# Patient Record
Sex: MALE | Race: WHITE | NOT HISPANIC OR LATINO | Employment: UNEMPLOYED
[De-identification: names, ages, dates, MRNs, and addresses within clinical notes are randomized per-mention and may not be internally consistent; named-entity substitution may affect disease eponyms.]

---

## 2017-01-04 ENCOUNTER — INFUSION - HEALTHEAST (OUTPATIENT)
Dept: INFUSION THERAPY | Age: 57
End: 2017-01-04

## 2017-01-04 DIAGNOSIS — E88.01 ALPHA-1-ANTITRYPSIN DEFICIENCY (H): ICD-10-CM

## 2017-01-10 ENCOUNTER — INFUSION - HEALTHEAST (OUTPATIENT)
Dept: INFUSION THERAPY | Age: 57
End: 2017-01-10

## 2017-01-10 ENCOUNTER — RECORDS - HEALTHEAST (OUTPATIENT)
Dept: ADMINISTRATIVE | Facility: OTHER | Age: 57
End: 2017-01-10

## 2017-01-10 DIAGNOSIS — E88.01 ALPHA-1-ANTITRYPSIN DEFICIENCY (H): ICD-10-CM

## 2017-01-17 ENCOUNTER — INFUSION - HEALTHEAST (OUTPATIENT)
Dept: INFUSION THERAPY | Age: 57
End: 2017-01-17

## 2017-01-17 DIAGNOSIS — E88.01 ALPHA-1-ANTITRYPSIN DEFICIENCY (H): ICD-10-CM

## 2017-01-25 ENCOUNTER — INFUSION - HEALTHEAST (OUTPATIENT)
Dept: INFUSION THERAPY | Age: 57
End: 2017-01-25

## 2017-01-25 DIAGNOSIS — E88.01 ALPHA-1-ANTITRYPSIN DEFICIENCY (H): ICD-10-CM

## 2017-02-01 ENCOUNTER — INFUSION - HEALTHEAST (OUTPATIENT)
Dept: INFUSION THERAPY | Age: 57
End: 2017-02-01

## 2017-02-01 DIAGNOSIS — E88.01 ALPHA-1-ANTITRYPSIN DEFICIENCY (H): ICD-10-CM

## 2017-02-07 ENCOUNTER — INFUSION - HEALTHEAST (OUTPATIENT)
Dept: INFUSION THERAPY | Age: 57
End: 2017-02-07

## 2017-02-07 DIAGNOSIS — E88.01 ALPHA-1-ANTITRYPSIN DEFICIENCY (H): ICD-10-CM

## 2017-02-14 ENCOUNTER — INFUSION - HEALTHEAST (OUTPATIENT)
Dept: INFUSION THERAPY | Age: 57
End: 2017-02-14

## 2017-02-14 DIAGNOSIS — E88.01 ALPHA-1-ANTITRYPSIN DEFICIENCY (H): ICD-10-CM

## 2017-02-21 ENCOUNTER — INFUSION - HEALTHEAST (OUTPATIENT)
Dept: INFUSION THERAPY | Age: 57
End: 2017-02-21

## 2017-02-21 DIAGNOSIS — E88.01 ALPHA-1-ANTITRYPSIN DEFICIENCY (H): ICD-10-CM

## 2017-02-28 ENCOUNTER — INFUSION - HEALTHEAST (OUTPATIENT)
Dept: INFUSION THERAPY | Age: 57
End: 2017-02-28

## 2017-02-28 DIAGNOSIS — E88.01 ALPHA-1-ANTITRYPSIN DEFICIENCY (H): ICD-10-CM

## 2017-03-08 ENCOUNTER — INFUSION - HEALTHEAST (OUTPATIENT)
Dept: INFUSION THERAPY | Age: 57
End: 2017-03-08

## 2017-03-08 DIAGNOSIS — E88.01 ALPHA-1-ANTITRYPSIN DEFICIENCY (H): ICD-10-CM

## 2017-03-14 ENCOUNTER — INFUSION - HEALTHEAST (OUTPATIENT)
Dept: INFUSION THERAPY | Age: 57
End: 2017-03-14

## 2017-03-14 DIAGNOSIS — E88.01 ALPHA-1-ANTITRYPSIN DEFICIENCY (H): ICD-10-CM

## 2017-03-22 ENCOUNTER — INFUSION - HEALTHEAST (OUTPATIENT)
Dept: INFUSION THERAPY | Age: 57
End: 2017-03-22

## 2017-03-22 DIAGNOSIS — E88.01 ALPHA-1-ANTITRYPSIN DEFICIENCY (H): ICD-10-CM

## 2017-03-30 ENCOUNTER — RECORDS - HEALTHEAST (OUTPATIENT)
Dept: ADMINISTRATIVE | Facility: OTHER | Age: 57
End: 2017-03-30

## 2017-03-30 ENCOUNTER — INFUSION - HEALTHEAST (OUTPATIENT)
Dept: INFUSION THERAPY | Age: 57
End: 2017-03-30

## 2017-03-30 DIAGNOSIS — E88.01 ALPHA-1-ANTITRYPSIN DEFICIENCY (H): ICD-10-CM

## 2017-04-05 ENCOUNTER — INFUSION - HEALTHEAST (OUTPATIENT)
Dept: INFUSION THERAPY | Age: 57
End: 2017-04-05

## 2017-04-05 DIAGNOSIS — E88.01 ALPHA-1-ANTITRYPSIN DEFICIENCY (H): ICD-10-CM

## 2017-04-11 ENCOUNTER — INFUSION - HEALTHEAST (OUTPATIENT)
Dept: INFUSION THERAPY | Age: 57
End: 2017-04-11

## 2017-04-11 DIAGNOSIS — E88.01 ALPHA-1-ANTITRYPSIN DEFICIENCY (H): ICD-10-CM

## 2017-04-18 ENCOUNTER — INFUSION - HEALTHEAST (OUTPATIENT)
Dept: INFUSION THERAPY | Age: 57
End: 2017-04-18

## 2017-04-18 DIAGNOSIS — E88.01 ALPHA-1-ANTITRYPSIN DEFICIENCY (H): ICD-10-CM

## 2017-04-26 ENCOUNTER — INFUSION - HEALTHEAST (OUTPATIENT)
Dept: INFUSION THERAPY | Age: 57
End: 2017-04-26

## 2017-04-26 ENCOUNTER — RECORDS - HEALTHEAST (OUTPATIENT)
Dept: ADMINISTRATIVE | Facility: OTHER | Age: 57
End: 2017-04-26

## 2017-04-26 DIAGNOSIS — E88.01 ALPHA-1-ANTITRYPSIN DEFICIENCY (H): ICD-10-CM

## 2017-05-04 ENCOUNTER — INFUSION - HEALTHEAST (OUTPATIENT)
Dept: INFUSION THERAPY | Age: 57
End: 2017-05-04

## 2017-05-04 DIAGNOSIS — E88.01 ALPHA-1-ANTITRYPSIN DEFICIENCY (H): ICD-10-CM

## 2017-05-10 ENCOUNTER — INFUSION - HEALTHEAST (OUTPATIENT)
Dept: INFUSION THERAPY | Age: 57
End: 2017-05-10

## 2017-05-10 DIAGNOSIS — E88.01 ALPHA-1-ANTITRYPSIN DEFICIENCY (H): ICD-10-CM

## 2017-05-16 ENCOUNTER — INFUSION - HEALTHEAST (OUTPATIENT)
Dept: INFUSION THERAPY | Age: 57
End: 2017-05-16

## 2017-05-16 DIAGNOSIS — E88.01 ALPHA-1-ANTITRYPSIN DEFICIENCY (H): ICD-10-CM

## 2017-05-23 ENCOUNTER — INFUSION - HEALTHEAST (OUTPATIENT)
Dept: INFUSION THERAPY | Age: 57
End: 2017-05-23

## 2017-05-23 DIAGNOSIS — E88.01 ALPHA-1-ANTITRYPSIN DEFICIENCY (H): ICD-10-CM

## 2017-05-31 ENCOUNTER — INFUSION - HEALTHEAST (OUTPATIENT)
Dept: INFUSION THERAPY | Age: 57
End: 2017-05-31

## 2017-05-31 DIAGNOSIS — E88.01 ALPHA-1-ANTITRYPSIN DEFICIENCY (H): ICD-10-CM

## 2017-06-05 ENCOUNTER — INFUSION - HEALTHEAST (OUTPATIENT)
Dept: INFUSION THERAPY | Age: 57
End: 2017-06-05

## 2017-06-05 DIAGNOSIS — E88.01 ALPHA-1-ANTITRYPSIN DEFICIENCY (H): ICD-10-CM

## 2018-01-12 ENCOUNTER — TELEPHONE (OUTPATIENT)
Dept: FAMILY MEDICINE | Facility: CLINIC | Age: 58
End: 2018-01-12

## 2018-01-12 NOTE — TELEPHONE ENCOUNTER
Reason for Call:  Other    Detailed comments: Alphaoneangigrypsin Lung difficiency, has pneumonia like symptoms last month and 1/2.    Phone Number Patient can be reached at: [phone]    Best Time: any    Call taken on 1/12/2018 at 1:15 PM by Arpita Baer

## 2018-01-12 NOTE — TELEPHONE ENCOUNTER
"\"My boyfriend is having trouble breathing and he had a diarrhea bug a few weeks ago and he has Alpha 1 antitripsin deficiency. He was just with this home care Nurse and they said to call to see if he can get in with someone or go to the ER. \"    \"His Temp is 101..\"    I asked if patient was struggling to breathe or had bluish lips or tongue and girlfriend stated: \"yes\"     I then stated they needed to hang up and call 911. To which girlfriend stated: \"He doesn't have bluish lips or tongue..\"     \"We are having trouble hearing you on speaker phone- we will just take him to the ER.. And she then hung up.    Jerri Cabral RN        "

## 2018-04-14 ENCOUNTER — TRANSFERRED RECORDS (OUTPATIENT)
Dept: HEALTH INFORMATION MANAGEMENT | Facility: CLINIC | Age: 58
End: 2018-04-14

## 2018-08-16 ENCOUNTER — HOSPITAL ENCOUNTER (EMERGENCY)
Facility: CLINIC | Age: 58
Discharge: HOME | End: 2018-08-16
Attending: EMERGENCY MEDICINE | Admitting: EMERGENCY MEDICINE
Payer: COMMERCIAL

## 2018-08-16 VITALS
SYSTOLIC BLOOD PRESSURE: 110 MMHG | BODY MASS INDEX: 24.5 KG/M2 | TEMPERATURE: 99 F | OXYGEN SATURATION: 91 % | HEIGHT: 71 IN | RESPIRATION RATE: 20 BRPM | WEIGHT: 175 LBS | DIASTOLIC BLOOD PRESSURE: 65 MMHG

## 2018-08-16 DIAGNOSIS — J44.1 COPD EXACERBATION (H): ICD-10-CM

## 2018-08-16 LAB
ANION GAP SERPL CALCULATED.3IONS-SCNC: 4 MMOL/L (ref 3–14)
BASE EXCESS BLDV CALC-SCNC: 3.5 MMOL/L
BASOPHILS # BLD AUTO: 0 10E9/L (ref 0–0.2)
BASOPHILS NFR BLD AUTO: 0.5 %
BUN SERPL-MCNC: 21 MG/DL (ref 7–30)
CALCIUM SERPL-MCNC: 8.1 MG/DL (ref 8.5–10.1)
CHLORIDE SERPL-SCNC: 112 MMOL/L (ref 94–109)
CO2 SERPL-SCNC: 25 MMOL/L (ref 20–32)
CREAT SERPL-MCNC: 1.08 MG/DL (ref 0.66–1.25)
CRP SERPL-MCNC: <2.9 MG/L (ref 0–8)
DIFFERENTIAL METHOD BLD: ABNORMAL
EOSINOPHIL # BLD AUTO: 0.4 10E9/L (ref 0–0.7)
EOSINOPHIL NFR BLD AUTO: 5.8 %
ERYTHROCYTE [DISTWIDTH] IN BLOOD BY AUTOMATED COUNT: 13.2 % (ref 10–15)
GFR SERPL CREATININE-BSD FRML MDRD: 70 ML/MIN/1.7M2
GLUCOSE SERPL-MCNC: 102 MG/DL (ref 70–99)
HCO3 BLDV-SCNC: 28 MMOL/L (ref 21–28)
HCT VFR BLD AUTO: 43.7 % (ref 40–53)
HGB BLD-MCNC: 14 G/DL (ref 13.3–17.7)
IMM GRANULOCYTES # BLD: 0 10E9/L (ref 0–0.4)
IMM GRANULOCYTES NFR BLD: 0.3 %
LYMPHOCYTES # BLD AUTO: 1.1 10E9/L (ref 0.8–5.3)
LYMPHOCYTES NFR BLD AUTO: 17.2 %
MCH RBC QN AUTO: 32.8 PG (ref 26.5–33)
MCHC RBC AUTO-ENTMCNC: 32 G/DL (ref 31.5–36.5)
MCV RBC AUTO: 102 FL (ref 78–100)
MONOCYTES # BLD AUTO: 0.6 10E9/L (ref 0–1.3)
MONOCYTES NFR BLD AUTO: 9.1 %
NEUTROPHILS # BLD AUTO: 4.4 10E9/L (ref 1.6–8.3)
NEUTROPHILS NFR BLD AUTO: 67.1 %
NRBC # BLD AUTO: 0 10*3/UL
NRBC BLD AUTO-RTO: 0 /100
PCO2 BLDV: 43 MM HG (ref 40–50)
PH BLDV: 7.43 PH (ref 7.32–7.43)
PLATELET # BLD AUTO: 185 10E9/L (ref 150–450)
PO2 BLDV: 51 MM HG (ref 25–47)
POTASSIUM SERPL-SCNC: 4.3 MMOL/L (ref 3.4–5.3)
RBC # BLD AUTO: 4.27 10E12/L (ref 4.4–5.9)
SODIUM SERPL-SCNC: 141 MMOL/L (ref 133–144)
TROPONIN I SERPL-MCNC: <0.015 UG/L (ref 0–0.04)
WBC # BLD AUTO: 6.6 10E9/L (ref 4–11)

## 2018-08-16 PROCEDURE — 93005 ELECTROCARDIOGRAM TRACING: CPT | Performed by: EMERGENCY MEDICINE

## 2018-08-16 PROCEDURE — 85025 COMPLETE CBC W/AUTO DIFF WBC: CPT | Performed by: EMERGENCY MEDICINE

## 2018-08-16 PROCEDURE — 80048 BASIC METABOLIC PNL TOTAL CA: CPT | Performed by: EMERGENCY MEDICINE

## 2018-08-16 PROCEDURE — 94640 AIRWAY INHALATION TREATMENT: CPT

## 2018-08-16 PROCEDURE — 84484 ASSAY OF TROPONIN QUANT: CPT | Performed by: EMERGENCY MEDICINE

## 2018-08-16 PROCEDURE — 99284 EMERGENCY DEPT VISIT MOD MDM: CPT | Mod: 25 | Performed by: EMERGENCY MEDICINE

## 2018-08-16 PROCEDURE — 93010 ELECTROCARDIOGRAM REPORT: CPT | Mod: Z6 | Performed by: EMERGENCY MEDICINE

## 2018-08-16 PROCEDURE — 86140 C-REACTIVE PROTEIN: CPT | Performed by: EMERGENCY MEDICINE

## 2018-08-16 PROCEDURE — 25000125 ZZHC RX 250: Performed by: EMERGENCY MEDICINE

## 2018-08-16 PROCEDURE — 82803 BLOOD GASES ANY COMBINATION: CPT | Performed by: EMERGENCY MEDICINE

## 2018-08-16 RX ORDER — IPRATROPIUM BROMIDE AND ALBUTEROL SULFATE 2.5; .5 MG/3ML; MG/3ML
3 SOLUTION RESPIRATORY (INHALATION) ONCE
Status: COMPLETED | OUTPATIENT
Start: 2018-08-16 | End: 2018-08-16

## 2018-08-16 RX ORDER — TAMSULOSIN HYDROCHLORIDE 0.4 MG/1
0.4 CAPSULE ORAL DAILY
COMMUNITY
Start: 2016-11-28 | End: 2020-06-01

## 2018-08-16 RX ORDER — ALBUTEROL SULFATE 0.83 MG/ML
2.5 SOLUTION RESPIRATORY (INHALATION)
COMMUNITY
Start: 2016-11-28

## 2018-08-16 RX ORDER — BUDESONIDE AND FORMOTEROL FUMARATE DIHYDRATE 160; 4.5 UG/1; UG/1
2 AEROSOL RESPIRATORY (INHALATION)
COMMUNITY
Start: 2016-11-28 | End: 2020-06-01

## 2018-08-16 RX ADMIN — IPRATROPIUM BROMIDE AND ALBUTEROL SULFATE 3 ML: .5; 2.5 SOLUTION RESPIRATORY (INHALATION) at 15:53

## 2018-08-16 ASSESSMENT — ENCOUNTER SYMPTOMS
FEVER: 0
SHORTNESS OF BREATH: 1
CHILLS: 0
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
## 2018-08-16 NOTE — ED AVS SNAPSHOT
[facility] Emergency Department    [address] [facility] [address]    Phone:  [phone]    Fax:  [phone]                                       Sg Quintana   MRN: [id number]    Department:  [facility] Emergency Department   Date of Visit:  8/16/2018           After Visit Summary Signature Page     I have received my discharge instructions, and my questions have been answered. I have discussed any challenges I see with this plan with the nurse or doctor.    ..........................................................................................................................................  Patient/Patient Representative Signature      ..........................................................................................................................................  Patient Representative Print Name and Relationship to Patient    ..................................................               ................................................  Date                                            Time    ..........................................................................................................................................  Reviewed by Signature/Title    ...................................................              ..............................................  Date                                                            Time

## 2018-08-16 NOTE — ED AVS SNAPSHOT
[facility] Emergency Department    [address] [facility] [address]    Phone:  [phone]    Fax:  [phone]                                       Sg Quintana   MRN: [id number]    Department:  [facility] Emergency Department   Date of Visit:  8/16/2018           Patient Information     Date Of Birth          1960        Your diagnoses for this visit were:     COPD exacerbation (H)        You were seen by Tushar Franks MD.      Follow-up Information     Follow up with No Ref-Primary, Physician.    Why:  As needed      Discharge References/Attachments     COPD FLARE (ENGLISH)      24 Hour Appointment Hotline       To make an appointment at any [facility] [facility] clinic, call [phone] ([phone]). If you don't have a family doctor or clinic, we will help you find one. [facility] [facility] clinics are conveniently located to serve the needs of you and your family.             Review of your medicines      Our records show that you are taking the medicines listed below. If these are incorrect, please call your family doctor or clinic.        Dose / Directions Last dose taken    albuterol (2.5 MG/3ML) 0.083% neb solution   Dose:  2.5 mg        Inhale 2.5 mg into the lungs   Refills:  0        fluticasone-salmeterol 100-50 MCG/DOSE diskus inhaler   Commonly known as:  ADVAIR   Dose:  1 puff        Inhale 1 puff into the lungs every 12 hours   Refills:  0        proteinase inhibitor 500 MG Solr   Commonly known as:  PROLASTIN   Dose:  60 mg/kg        Inject 60 mg/kg into the vein once a week   Refills:  0        sertraline 50 MG tablet   Commonly known as:  ZOLOFT        Take by mouth daily   Refills:  0        SYMBICORT 160-4.5 MCG/ACT Inhaler   Dose:  2 puff   Generic drug:  budesonide-formoterol        Inhale 2 puffs into the lungs   Refills:  0        * tamsulosin 0.4 MG capsule   Commonly known as:  FLOMAX   Dose:  0.4 mg   Quantity:  30 capsule        Take 1 capsule (0.4 mg) by mouth daily    Refills:  11        * tamsulosin 0.4 MG capsule   Commonly known as:  FLOMAX   Dose:  0.4 mg        Take 0.4 mg by mouth daily   Refills:  0        tiotropium 18 MCG capsule   Commonly known as:  SPIRIVA   Dose:  18 mcg        Inhale 18 mcg into the lungs daily   Refills:  0        * Notice:  This list has 2 medication(s) that are the same as other medications prescribed for you. Read the directions carefully, and ask your doctor or other care provider to review them with you.            Procedures and tests performed during your visit     Basic metabolic panel    Blood gas venous    CBC with platelets differential    CRP inflammation    EKG 12-lead, tracing only    Troponin I      Orders Needing Specimen Collection     None      Pending Results     No orders found from 8/14/2018 to 8/17/2018.            Pending Culture Results     No orders found from 8/14/2018 to 8/17/2018.            Pending Results Instructions     If you had any lab results that were not finalized at the time of your Discharge, you can call the ED Lab Result RN at [phone]. You will be contacted by this team for any positive Lab results or changes in treatment. The nurses are available 7 days a week from 10A to 6:30P.  You can leave a message 24 hours per day and they will return your call.        Test Results From Your Hospital Stay        8/16/2018  4:10 PM      Component Results     Component Value Ref Range & Units Status    WBC 6.6 4.0 - 11.0 10e9/L Final    RBC Count 4.27 (L) 4.4 - 5.9 10e12/L Final    Hemoglobin 14.0 13.3 - 17.7 g/dL Final    Hematocrit 43.7 40.0 - 53.0 % Final     (H) 78 - 100 fl Final    MCH 32.8 26.5 - 33.0 pg Final    MCHC 32.0 31.5 - 36.5 g/dL Final    RDW 13.2 10.0 - 15.0 % Final    Platelet Count 185 150 - 450 10e9/L Final    Diff Method Automated Method  Final    % Neutrophils 67.1 % Final    % Lymphocytes 17.2 % Final    % Monocytes 9.1 % Final    % Eosinophils 5.8 % Final    % Basophils 0.5 % Final     % Immature Granulocytes 0.3 % Final    Nucleated RBCs 0 0 /100 Final    Absolute Neutrophil 4.4 1.6 - 8.3 10e9/L Final    Absolute Lymphocytes 1.1 0.8 - 5.3 10e9/L Final    Absolute Monocytes 0.6 0.0 - 1.3 10e9/L Final    Absolute Eosinophils 0.4 0.0 - 0.7 10e9/L Final    Absolute Basophils 0.0 0.0 - 0.2 10e9/L Final    Abs Immature Granulocytes 0.0 0 - 0.4 10e9/L Final    Absolute Nucleated RBC 0.0  Final         8/16/2018  4:49 PM      Component Results     Component Value Ref Range & Units Status    Sodium 141 133 - 144 mmol/L Final    Potassium 4.3 3.4 - 5.3 mmol/L Final    Specimen slightly hemolyzed, potassium may be falsely elevated    Chloride 112 (H) 94 - 109 mmol/L Final    Carbon Dioxide 25 20 - 32 mmol/L Final    Anion Gap 4 3 - 14 mmol/L Final    Glucose 102 (H) 70 - 99 mg/dL Final    Urea Nitrogen 21 7 - 30 mg/dL Final    Creatinine 1.08 0.66 - 1.25 mg/dL Final    GFR Estimate 70 >60 mL/min/1.7m2 Final    Non  GFR Calc    GFR Estimate If Black 85 >60 mL/min/1.7m2 Final    African American GFR Calc    Calcium 8.1 (L) 8.5 - 10.1 mg/dL Final         8/16/2018  4:49 PM      Component Results     Component Value Ref Range & Units Status    Troponin I ES <0.015 0.000 - 0.045 ug/L Final    The 99th percentile for upper reference range is 0.045 ug/L.  Troponin values   in the range of 0.045 - 0.120 ug/L may be associated with risks of adverse   clinical events.           8/16/2018  4:49 PM      Component Results     Component Value Ref Range & Units Status    CRP Inflammation <2.9 0.0 - 8.0 mg/L Final         8/16/2018  4:12 PM      Component Results     Component Value Ref Range & Units Status    Ph Venous 7.43 7.32 - 7.43 pH Final    PCO2 Venous 43 40 - 50 mm Hg Final    PO2 Venous 51 (H) 25 - 47 mm Hg Final    Bicarbonate Venous 28 21 - 28 mmol/L Final    Base Excess Venous 3.5 mmol/L Final    Abnormal Result, Ref range: -7.7 to 1.9                Thank you for choosing [facility]      "  Thank you for choosing [facility] for your care. Our goal is always to provide you with excellent care. Hearing back from our patients is one way we can continue to improve our services. Please take a few minutes to complete the written survey that you may receive in the mail after you visit with us. Thank you!        [facility] Information     [facility] lets you send messages to your doctor, view your test results, renew your prescriptions, schedule appointments and more. To sign up, go to [email or web address] . Click on \"Log in\" on the left side of the screen, which will take you to the Welcome page. Then click on \"Sign up Now\" on the right side of the page.     You will be asked to enter the access code listed below, as well as some personal information. Please follow the directions to create your username and password.     Your access code is: [email or web address]  Expires: 2018  5:02 PM     Your access code will  in 90 days. If you need help or a new code, please call your [facility] clinic or [phone].        Care EveryWhere ID     This is your Care EveryWhere ID. This could be used by other organizations to access your [facility] medical records  [id number]        Equal Access to Services     PHIL GARCIA : Stone Muñiz, wabenny guaman, qaybta kaalmada amauri, juan gonzalez. So [facility] [phone].    ATENCIÓN: Si habla español, tiene a heller disposición servicios gratuitos de asistencia lingüística. Llame al [phone].    We comply with applicable federal civil rights laws and [place] laws. We do not discriminate on the basis of race, color, national origin, age, disability, sex, sexual orientation, or gender identity.            After Visit Summary       This is your record. Keep this with you and show to your community pharmacist(s) and doctor(s) at your next visit.                  "

## 2018-08-30 ENCOUNTER — INFUSION - HEALTHEAST (OUTPATIENT)
Dept: INFUSION THERAPY | Facility: HOSPITAL | Age: 58
End: 2018-08-30

## 2018-08-30 ENCOUNTER — RECORDS - HEALTHEAST (OUTPATIENT)
Dept: ADMINISTRATIVE | Facility: OTHER | Age: 58
End: 2018-08-30

## 2018-08-30 DIAGNOSIS — E88.01 ALPHA-1-ANTITRYPSIN DEFICIENCY (H): ICD-10-CM

## 2018-08-30 ASSESSMENT — MIFFLIN-ST. JEOR: SCORE: 1580.57

## 2018-09-06 ENCOUNTER — INFUSION - HEALTHEAST (OUTPATIENT)
Dept: INFUSION THERAPY | Facility: HOSPITAL | Age: 58
End: 2018-09-06

## 2018-09-06 DIAGNOSIS — E88.01 ALPHA-1-ANTITRYPSIN DEFICIENCY (H): ICD-10-CM

## 2018-09-13 ENCOUNTER — INFUSION - HEALTHEAST (OUTPATIENT)
Dept: INFUSION THERAPY | Facility: HOSPITAL | Age: 58
End: 2018-09-13

## 2018-09-13 DIAGNOSIS — E88.01 ALPHA-1-ANTITRYPSIN DEFICIENCY (H): ICD-10-CM

## 2018-09-27 ENCOUNTER — INFUSION - HEALTHEAST (OUTPATIENT)
Dept: INFUSION THERAPY | Facility: HOSPITAL | Age: 58
End: 2018-09-27

## 2018-09-27 DIAGNOSIS — E88.01 ALPHA-1-ANTITRYPSIN DEFICIENCY (H): ICD-10-CM

## 2018-10-04 ENCOUNTER — INFUSION - HEALTHEAST (OUTPATIENT)
Dept: INFUSION THERAPY | Facility: HOSPITAL | Age: 58
End: 2018-10-04

## 2018-10-04 DIAGNOSIS — E88.01 ALPHA-1-ANTITRYPSIN DEFICIENCY (H): ICD-10-CM

## 2018-10-11 ENCOUNTER — INFUSION - HEALTHEAST (OUTPATIENT)
Dept: INFUSION THERAPY | Facility: HOSPITAL | Age: 58
End: 2018-10-11

## 2018-10-11 DIAGNOSIS — E88.01 ALPHA-1-ANTITRYPSIN DEFICIENCY (H): ICD-10-CM

## 2018-10-17 ENCOUNTER — INFUSION - HEALTHEAST (OUTPATIENT)
Dept: INFUSION THERAPY | Facility: HOSPITAL | Age: 58
End: 2018-10-17

## 2018-10-17 DIAGNOSIS — E88.01 ALPHA-1-ANTITRYPSIN DEFICIENCY (H): ICD-10-CM

## 2018-10-22 ENCOUNTER — COMMUNICATION - HEALTHEAST (OUTPATIENT)
Dept: INFUSION THERAPY | Facility: HOSPITAL | Age: 58
End: 2018-10-22

## 2018-10-25 ENCOUNTER — INFUSION - HEALTHEAST (OUTPATIENT)
Dept: INFUSION THERAPY | Facility: HOSPITAL | Age: 58
End: 2018-10-25

## 2018-10-25 DIAGNOSIS — E88.01 ALPHA-1-ANTITRYPSIN DEFICIENCY (H): ICD-10-CM

## 2018-10-29 ENCOUNTER — COMMUNICATION - HEALTHEAST (OUTPATIENT)
Dept: INFUSION THERAPY | Facility: HOSPITAL | Age: 58
End: 2018-10-29

## 2018-11-01 ENCOUNTER — INFUSION - HEALTHEAST (OUTPATIENT)
Dept: INFUSION THERAPY | Facility: HOSPITAL | Age: 58
End: 2018-11-01

## 2018-11-01 DIAGNOSIS — E88.01 ALPHA-1-ANTITRYPSIN DEFICIENCY (H): ICD-10-CM

## 2018-11-21 ENCOUNTER — HOSPITAL ENCOUNTER (EMERGENCY)
Facility: CLINIC | Age: 58
Discharge: HOME | End: 2018-11-21
Attending: EMERGENCY MEDICINE | Admitting: EMERGENCY MEDICINE
Payer: COMMERCIAL

## 2018-11-21 VITALS
RESPIRATION RATE: 20 BRPM | DIASTOLIC BLOOD PRESSURE: 60 MMHG | BODY MASS INDEX: 23.1 KG/M2 | SYSTOLIC BLOOD PRESSURE: 88 MMHG | HEIGHT: 71 IN | WEIGHT: 165 LBS | OXYGEN SATURATION: 88 % | TEMPERATURE: 97.8 F

## 2018-11-21 DIAGNOSIS — R05.9 COUGH: ICD-10-CM

## 2018-11-21 DIAGNOSIS — E88.09 ALPHA-1-ANTICHYMOTRYPSIN DEFICIENCY: ICD-10-CM

## 2018-11-21 DIAGNOSIS — J43.9 PULMONARY EMPHYSEMA, UNSPECIFIED EMPHYSEMA TYPE (H): ICD-10-CM

## 2018-11-21 DIAGNOSIS — E88.01 ALPHA-1-ANTITRYPSIN DEFICIENCY (H): Primary | ICD-10-CM

## 2018-11-21 PROBLEM — E55.9 VITAMIN D DEFICIENCY: Status: ACTIVE | Noted: 2018-11-21

## 2018-11-21 PROBLEM — Z87.891 PERSONAL HISTORY OF TOBACCO USE, PRESENTING HAZARDS TO HEALTH: Status: ACTIVE | Noted: 2018-11-21

## 2018-11-21 PROBLEM — J44.9 COPD (CHRONIC OBSTRUCTIVE PULMONARY DISEASE) (H): Status: ACTIVE | Noted: 2018-11-21

## 2018-11-21 LAB
ALBUMIN SERPL-MCNC: 3.6 G/DL (ref 3.4–5)
ALP SERPL-CCNC: 87 U/L (ref 40–150)
ALT SERPL W P-5'-P-CCNC: 33 U/L (ref 0–70)
ANION GAP SERPL CALCULATED.3IONS-SCNC: 6 MMOL/L (ref 3–14)
AST SERPL W P-5'-P-CCNC: 36 U/L (ref 0–45)
BASOPHILS # BLD AUTO: 0 10E9/L (ref 0–0.2)
BASOPHILS NFR BLD AUTO: 0.2 %
BILIRUB SERPL-MCNC: 0.4 MG/DL (ref 0.2–1.3)
BUN SERPL-MCNC: 30 MG/DL (ref 7–30)
CALCIUM SERPL-MCNC: 8.6 MG/DL (ref 8.5–10.1)
CHLORIDE SERPL-SCNC: 109 MMOL/L (ref 94–109)
CO2 SERPL-SCNC: 30 MMOL/L (ref 20–32)
CREAT SERPL-MCNC: 0.86 MG/DL (ref 0.66–1.25)
DIFFERENTIAL METHOD BLD: ABNORMAL
EOSINOPHIL # BLD AUTO: 0.1 10E9/L (ref 0–0.7)
EOSINOPHIL NFR BLD AUTO: 0.5 %
ERYTHROCYTE [DISTWIDTH] IN BLOOD BY AUTOMATED COUNT: 12.5 % (ref 10–15)
GFR SERPL CREATININE-BSD FRML MDRD: >90 ML/MIN/1.7M2
GLUCOSE SERPL-MCNC: 105 MG/DL (ref 70–99)
HCT VFR BLD AUTO: 45.2 % (ref 40–53)
HGB BLD-MCNC: 14.6 G/DL (ref 13.3–17.7)
IMM GRANULOCYTES # BLD: 0 10E9/L (ref 0–0.4)
IMM GRANULOCYTES NFR BLD: 0.4 %
LYMPHOCYTES # BLD AUTO: 1.7 10E9/L (ref 0.8–5.3)
LYMPHOCYTES NFR BLD AUTO: 15.2 %
MCH RBC QN AUTO: 33.5 PG (ref 26.5–33)
MCHC RBC AUTO-ENTMCNC: 32.3 G/DL (ref 31.5–36.5)
MCV RBC AUTO: 104 FL (ref 78–100)
MONOCYTES # BLD AUTO: 1.1 10E9/L (ref 0–1.3)
MONOCYTES NFR BLD AUTO: 9.6 %
NEUTROPHILS # BLD AUTO: 8.4 10E9/L (ref 1.6–8.3)
NEUTROPHILS NFR BLD AUTO: 74.1 %
NRBC # BLD AUTO: 0 10*3/UL
NRBC BLD AUTO-RTO: 0 /100
PLATELET # BLD AUTO: 192 10E9/L (ref 150–450)
POTASSIUM SERPL-SCNC: 4.4 MMOL/L (ref 3.4–5.3)
PROT SERPL-MCNC: 7.1 G/DL (ref 6.8–8.8)
RBC # BLD AUTO: 4.36 10E12/L (ref 4.4–5.9)
SODIUM SERPL-SCNC: 145 MMOL/L (ref 133–144)
TROPONIN I SERPL-MCNC: 0.03 UG/L (ref 0–0.04)
WBC # BLD AUTO: 11.3 10E9/L (ref 4–11)

## 2018-11-21 PROCEDURE — 93005 ELECTROCARDIOGRAM TRACING: CPT | Performed by: EMERGENCY MEDICINE

## 2018-11-21 PROCEDURE — 99285 EMERGENCY DEPT VISIT HI MDM: CPT | Mod: 25 | Performed by: EMERGENCY MEDICINE

## 2018-11-21 PROCEDURE — 94640 AIRWAY INHALATION TREATMENT: CPT

## 2018-11-21 PROCEDURE — 25000125 ZZHC RX 250: Performed by: EMERGENCY MEDICINE

## 2018-11-21 PROCEDURE — 84484 ASSAY OF TROPONIN QUANT: CPT | Performed by: EMERGENCY MEDICINE

## 2018-11-21 PROCEDURE — 93010 ELECTROCARDIOGRAM REPORT: CPT | Mod: Z6 | Performed by: EMERGENCY MEDICINE

## 2018-11-21 PROCEDURE — 85025 COMPLETE CBC W/AUTO DIFF WBC: CPT | Performed by: EMERGENCY MEDICINE

## 2018-11-21 PROCEDURE — 80053 COMPREHEN METABOLIC PANEL: CPT | Performed by: EMERGENCY MEDICINE

## 2018-11-21 RX ORDER — IPRATROPIUM BROMIDE AND ALBUTEROL SULFATE 2.5; .5 MG/3ML; MG/3ML
3 SOLUTION RESPIRATORY (INHALATION) ONCE
Status: COMPLETED | OUTPATIENT
Start: 2018-11-21 | End: 2018-11-21

## 2018-11-21 RX ADMIN — IPRATROPIUM BROMIDE AND ALBUTEROL SULFATE 3 ML: .5; 3 SOLUTION RESPIRATORY (INHALATION) at 05:25

## 2018-11-21 ASSESSMENT — ENCOUNTER SYMPTOMS
COUGH: 0
CHEST TIGHTNESS: 1
CHILLS: 0
HEADACHES: 0
RHINORRHEA: 0
SHORTNESS OF BREATH: 1
ABDOMINAL PAIN: 0
VOMITING: 0
FEVER: 0
LIGHT-HEADEDNESS: 0
NAUSEA: 0
APPETITE CHANGE: 0
BACK PAIN: 0

## 2018-11-21 NOTE — ED PROVIDER NOTES
History     Chief Complaint   Patient presents with     Shortness of Breath     States he has SOB / walked 4 miles to hospital on No O2/ Uses O2 at home     HPI  Sg Quintana is a 58 year old male with history of alpha-1 antitrypsin deficiency and secondary COPD presenting for evaluation of difficulty breathing.  Patient reports that he has been off of his Prolastin for the last roughly 3 weeks and due to a change in his pulmonologist.  He reports that his pulmonology group was bought out by another company and he has been unable to get an appointment with a new pulmonologist until the end of December.  Apparently his pharmacy is unwilling to release the medication until it is approved by another pulmonologist.  Patient reports he has been having some increased difficulty breathing recently with pronounced worsening tonight prompting evaluation.  Symptoms began while at [facility] in [place] and he reports that when he went to his car to drive here, it would not start, so he decided to walk the roughly 4 miles to the hospital.  Denies fever chills.  Denies significant cough.  Reports some central and left-sided chest pain tonight associated with his difficulty breathing.  No rhinorrhea or fever.  He does report his baseline oxygen saturations have been running around 80-90% on room air and that he uses 2 L nasal cannula oxygen as needed for dyspnea.    Problem List:    Patient Active Problem List    Diagnosis Date Noted     Alpha-1-antitrypsin deficiency (H) 11/21/2018     Priority: Medium     COPD (chronic obstructive pulmonary disease) (H) 11/21/2018     Priority: Medium     Overview:   [facility]       Pulmonary emphysema (H) 11/21/2018     Priority: Medium     Personal history of tobacco use, presenting hazards to health 11/21/2018     Priority: Medium     Vitamin D deficiency 11/21/2018     Priority: Medium     CARDIOVASCULAR SCREENING; LDL GOAL LESS THAN 160 03/26/2014     Priority: Medium     Urinary  "obstruction 02/06/2014     Priority: Medium     Problem list name updated by automated process. Provider to review          Past Medical History:    No past medical history on file.    Past Surgical History:    No past surgical history on file.    Family History:    Family History   Problem Relation Age of Onset     Cancer Mother      lung     Cancer Father      bone     Diabetes Paternal Grandmother        Social History:  Marital Status:  Single [1]  Social History   Substance Use Topics     Smoking status: Never Smoker     Smokeless tobacco: Never Used     Alcohol use No        Medications:      albuterol (2.5 MG/3ML) 0.083% neb solution   budesonide-formoterol (SYMBICORT) 160-4.5 MCG/ACT Inhaler   proteinase inhibitor (PROLASTIN) 500 MG SOLR   sertraline (ZOLOFT) 50 MG tablet   tamsulosin (FLOMAX) 0.4 MG 24 hr capsule   tamsulosin (FLOMAX) 0.4 MG capsule   tiotropium (SPIRIVA) 18 MCG inhalation capsule         Review of Systems   Constitutional: Negative for appetite change, chills and fever.   HENT: Negative for congestion and rhinorrhea.    Respiratory: Positive for chest tightness and shortness of breath. Negative for cough.    Cardiovascular: Positive for chest pain.   Gastrointestinal: Negative for abdominal pain, nausea and vomiting.   Musculoskeletal: Negative for back pain.   Skin: Negative for rash.   Neurological: Negative for light-headedness and headaches.   All other systems reviewed and are negative.      Physical Exam   BP: 123/79  Heart Rate: 79  Temp: 97.8  F (36.6  C)  Resp: 24  Height: 180.3 cm (5' 11\")  Weight: 74.8 kg (165 lb)  SpO2: (!) 88 %      Physical Exam   Constitutional: He is oriented to person, place, and time. He appears well-developed and well-nourished. No distress.   HENT:   Head: Normocephalic and atraumatic.   Eyes: Conjunctivae are normal.   Cardiovascular: Normal rate and regular rhythm.    Pulmonary/Chest: Effort normal and breath sounds normal. No respiratory distress. He " has no wheezes. He has no rales.   Abdominal: Soft. There is no tenderness.   Musculoskeletal: Normal range of motion. He exhibits no edema or tenderness.   Neurological: He is alert and oriented to person, place, and time.   Skin: Skin is warm and dry. He is not diaphoretic.   Psychiatric: He has a normal mood and affect.   Nursing note and vitals reviewed.      ED Course     ED Course     Procedures                   Results for orders placed or performed during the hospital encounter of 11/21/18 (from the past 24 hour(s))   CBC with platelets differential   Result Value Ref Range    WBC 11.3 (H) 4.0 - 11.0 10e9/L    RBC Count 4.36 (L) 4.4 - 5.9 10e12/L    Hemoglobin 14.6 13.3 - 17.7 g/dL    Hematocrit 45.2 40.0 - 53.0 %     (H) 78 - 100 fl    MCH 33.5 (H) 26.5 - 33.0 pg    MCHC 32.3 31.5 - 36.5 g/dL    RDW 12.5 10.0 - 15.0 %    Platelet Count 192 150 - 450 10e9/L    Diff Method Automated Method     % Neutrophils 74.1 %    % Lymphocytes 15.2 %    % Monocytes 9.6 %    % Eosinophils 0.5 %    % Basophils 0.2 %    % Immature Granulocytes 0.4 %    Nucleated RBCs 0 0 /100    Absolute Neutrophil 8.4 (H) 1.6 - 8.3 10e9/L    Absolute Lymphocytes 1.7 0.8 - 5.3 10e9/L    Absolute Monocytes 1.1 0.0 - 1.3 10e9/L    Absolute Eosinophils 0.1 0.0 - 0.7 10e9/L    Absolute Basophils 0.0 0.0 - 0.2 10e9/L    Abs Immature Granulocytes 0.0 0 - 0.4 10e9/L    Absolute Nucleated RBC 0.0    Comprehensive metabolic panel   Result Value Ref Range    Sodium 145 (H) 133 - 144 mmol/L    Potassium 4.4 3.4 - 5.3 mmol/L    Chloride 109 94 - 109 mmol/L    Carbon Dioxide 30 20 - 32 mmol/L    Anion Gap 6 3 - 14 mmol/L    Glucose 105 (H) 70 - 99 mg/dL    Urea Nitrogen 30 7 - 30 mg/dL    Creatinine 0.86 0.66 - 1.25 mg/dL    GFR Estimate >90 >60 mL/min/1.7m2    GFR Estimate If Black >90 >60 mL/min/1.7m2    Calcium 8.6 8.5 - 10.1 mg/dL    Bilirubin Total 0.4 0.2 - 1.3 mg/dL    Albumin 3.6 3.4 - 5.0 g/dL    Protein Total 7.1 6.8 - 8.8 g/dL     Alkaline Phosphatase 87 40 - 150 U/L    ALT 33 0 - 70 U/L    AST 36 0 - 45 U/L   Troponin I   Result Value Ref Range    Troponin I ES 0.027 0.000 - 0.045 ug/L       Medications   ipratropium - albuterol 0.5 mg/2.5 mg/3 mL (DUONEB) neb solution 3 mL (3 mLs Nebulization Given 11/21/18 0525)     4:49 AM: Discussed with pharmacist Donovan, he checked in our system to see if there was any Prolastin available and he confirms there is not.  He reports that to the best of his knowledge, this medicine comes in an individual patient basis due to its cost and insurance hurdles for obtaining the medication.    5:28 AM: Discussed with Dr Griffith, pulmonologist at the [facility]. He will pass patient info to his coordinators and try to get patient into the clinic next week.     5:35 AM: Patient reassessed.  Breathing remains stable.  No difficulty breathing.  Advised of plan for pulmonary to follow-up with him in the next few days to schedule an appointment.    Assessments & Plan (with Medical Decision Making)  58-year-old male with history of alpha-1 antitrypsin deficiency and secondary COPD presenting for evaluation of dyspnea.  Patient walked here from the local [facility] about 4 miles away and although winded upon arrival had room air oxygen saturation of 88%.  Patient quickly relaxed with normal oxygenation and no difficulty breathing after being placed on nasal cannula oxygen at 2 L.  He did have some slight wheezing so was given a DuoNeb.  He had no recurrent wheezing or difficulty breathing and is or where his oxygen saturations remained around 88-90% with no return of difficulty breathing.  Consulted with pulmonology at the  who will help coordinate follow-up for the patient to obtain his prescribed Prolastin sooner.  Patient reports he has all his other normal medications including Symbicort and albuterol with no need for refills of these prescriptions.  Patient also has access to home O2 and does not need any further assistance  obtaining O2.  Given his respiratory status seems to be largely at baseline at this point, discharged home with plan for close pulmonology follow-up.  Advised that if his symptoms worsen, to return to the ED for repeat evaluation.     I have reviewed the nursing notes.    I have reviewed the findings, diagnosis, plan and need for follow up with the patient.       New Prescriptions    No medications on file       Final diagnoses:   Pulmonary emphysema, unspecified emphysema type (H)   Alpha-1-antichymotrypsin deficiency       11/21/2018   [facility] EMERGENCY DEPARTMENT     Anders Penaloza MD  11/24/18 4608      11/29/18  Addendum: EKG interpretation documentation accidentally omitted from original hugo. Original interpretation done at the time of patient evaluationt:         EKG Interpretation:      Interpreted by Anders Penaloza  Time reviewed:430   Symptoms at time of EKG: dyspnea  Rhythm: Normal sinus   Rate: Normal  Axis: Right Axis Deviation  Ectopy: None  Conduction: Normal  ST Segments/ T Waves: No acute ischemic changes  Q Waves: None  Comparison to prior: not significantly changed from previous    Clinical Impression: sinus rhythm with right axcis but no acute ischemic abnormalities.         Anders Penaloza MD  11/29/18 0149

## 2018-11-21 NOTE — ED NOTES
Pt arrives to the ED after arriving after a 4 mile walk from [place]. He states that his car broke down and he walked to the hospital from [place]. He has  Alpha 1 antitrypsin deficiency. He requires oxygen at home PRN. He states his problems are pulmonary. He states that his legs and chest hurt after the walk. He is hypoxic on arrival but states he normally runs in the high 80's for PO2 SATs,  He  States that he has no other complaints. He states that he is on an expensive drug that he requires. It is very expensive and has not had it in 3 weeks.

## 2018-11-21 NOTE — DISCHARGE INSTRUCTIONS
Discharge Instructions: COPD  You have been diagnosed with chronic obstructive pulmonary disease (COPD). This is a name given to a group of diseases that limit the flow of air in and out of your lungs. This makes it harder to breathe. With COPD, you are also more likely to get lung infections. COPD includes chronic bronchitis and emphysema. COPD is most often caused by heavy, long-term cigarette smoking.  Home care  Quit smoking    If you smoke, quit. It is the best thing you can do for your COPD and your overall health.    Join a stop-smoking program. There are even telephone, text message, and Internet programs to help you quit.    Ask your healthcare provider about medicines or other methods to help you quit.    Ask family members to quit smoking as well.    Don't allow people to smoke in your home, in your car, or when they are around you.  Protect yourself from infection    Wash your hands often. Do your best to keep your hands away from your face. Most germs are spread from your hands to your mouth.    Get a flu shot every year. Also ask your provider about pneumonia vaccines.    Avoid crowds. It's especially important to do this in the winter when more people have colds and flu.    To stay healthy, get enough sleep, exercise regularly, and eat a balanced diet. You should:  ? Get about 8 hours of sleep every night.  ? Try to exercise for at least 30 minutes on most days.  ? Have healthy foods including fruits and vegetables, 100% whole grains, lean meats and fish, and low-fat dairy products. Try to stay away from foods high in fats and sugar.  Take your medicines  Take your medicines exactly as directed. Don't skip doses.  Manage your stress  Stress can make COPD worse. Use this stress management technique:    Find a quiet place and sit or lie in a comfortable position.    Close your eyes and perform breathing exercises for several minutes. Ask your provider about the best way to breathe.  Pulmonary  rehabilitation    Pulmonary rehab can help you feel better. These programs include exercise, breathing techniques, information about COPD, counseling, and help for smokers.    Ask your provider or your local hospital about programs in your area.  When to call your healthcare provider  Call your provider immediately if you have any of the following:    Shortness of breath, wheezing, or coughing    Increased mucus    Yellow, green, bloody, or smelly mucus    Fever or chills    Tightness in your chest that does not go away with rest or medicine    An irregular heartbeat or a feeling that your heart is beating very fast    Swollen ankles   Date Last Reviewed: 5/1/2016 2000-2018 [employer]. [address] [place] [address], [place] [address]. All rights reserved. This information is not intended as a substitute for professional medical care. Always follow your healthcare professional's instructions.

## 2018-11-21 NOTE — ED NOTES
Pt alert and oriented with no acute SOB on departure. He states that he has a 3-4 mile ride home by cab. His blood pressure is soft in the 90 systolic range on departure with no complaints of light headedness. Pt  Was on Room Air for 30 minutes prior to departure with )2 sats in the high 80's. He improves if he takes a deep breath.

## 2018-11-21 NOTE — ED AVS SNAPSHOT
[facility] Emergency Department    [address] [address]    Phone:  [phone]    Fax:  [phone]                                       Sg Quintana   MRN: [id number]    Department:  [facility] Emergency Department   Date of Visit:  11/21/2018           After Visit Summary Signature Page     I have received my discharge instructions, and my questions have been answered. I have discussed any challenges I see with this plan with the nurse or doctor.    ..........................................................................................................................................  Patient/Patient Representative Signature      ..........................................................................................................................................  Patient Representative Print Name and Relationship to Patient    ..................................................               ................................................  Date                                   Time    ..........................................................................................................................................  Reviewed by Signature/Title    ...................................................              ..............................................  Date                                               Time          22EPIC Rev 08/18

## 2018-11-21 NOTE — ED AVS SNAPSHOT
[facility] Emergency Department    [address] [facility] [address]    Phone:  [phone]    Fax:  [phone]                                       Sg Quintana   MRN: [id number]    Department:  [facility] Emergency Department   Date of Visit:  11/21/2018           Patient Information     Date Of Birth          1960        Your diagnoses for this visit were:     Pulmonary emphysema, unspecified emphysema type (H)     Alpha-1-antichymotrypsin deficiency        You were seen by Anders Penaloza MD.      Follow-up Information     Follow up with Your pulmonologist. Call today.    Why:  Call to obtain a refill for your prescription medication as soon as possible        Go to [facility] Emergency Department.    Specialty:  EMERGENCY MEDICINE    Why:  As needed, If symptoms worsen    Contact information:    [address] [place] [id number] [phone]    Additional information:    The medical center is located at   [address] [facility] (between [facility] and   [facility] [address] in [place], [place] north   of [place]).        Discharge Instructions         Discharge Instructions: COPD  You have been diagnosed with chronic obstructive pulmonary disease (COPD). This is a name given to a group of diseases that limit the flow of air in and out of your lungs. This makes it harder to breathe. With COPD, you are also more likely to get lung infections. COPD includes chronic bronchitis and emphysema. COPD is most often caused by heavy, long-term cigarette smoking.  Home care  Quit smoking    If you smoke, quit. It is the best thing you can do for your COPD and your overall health.    Join a stop-smoking program. There are even telephone, text message, and Internet programs to help you quit.    Ask your healthcare provider about medicines or other methods to help you quit.    Ask family members to quit smoking as well.    Don't allow people to smoke in your home, in your car, or  when they are around you.  Protect yourself from infection    Wash your hands often. Do your best to keep your hands away from your face. Most germs are spread from your hands to your mouth.    Get a flu shot every year. Also ask your provider about pneumonia vaccines.    Avoid crowds. It's especially important to do this in the winter when more people have colds and flu.    To stay healthy, get enough sleep, exercise regularly, and eat a balanced diet. You should:  ? Get about 8 hours of sleep every night.  ? Try to exercise for at least 30 minutes on most days.  ? Have healthy foods including fruits and vegetables, 100% whole grains, lean meats and fish, and low-fat dairy products. Try to stay away from foods high in fats and sugar.  Take your medicines  Take your medicines exactly as directed. Don't skip doses.  Manage your stress  Stress can make COPD worse. Use this stress management technique:    Find a quiet place and sit or lie in a comfortable position.    Close your eyes and perform breathing exercises for several minutes. Ask your provider about the best way to breathe.  Pulmonary rehabilitation    Pulmonary rehab can help you feel better. These programs include exercise, breathing techniques, information about COPD, counseling, and help for smokers.    Ask your provider or your local hospital about programs in your area.  When to call your healthcare provider  Call your provider immediately if you have any of the following:    Shortness of breath, wheezing, or coughing    Increased mucus    Yellow, green, bloody, or smelly mucus    Fever or chills    Tightness in your chest that does not go away with rest or medicine    An irregular heartbeat or a feeling that your heart is beating very fast    Swollen ankles   Date Last Reviewed: 5/1/2016 2000-2018 [employer]. [address] [place] [address]. All rights reserved. This information is not intended as a substitute for  professional medical care. Always follow your healthcare professional's instructions.          24 Hour Appointment Hotline       To make an appointment at any [facility] [place], call [phone] ([phone]). If you don't have a family doctor or clinic, we will help you find one. [place] clinics are conveniently located to serve the needs of you and your family.             Review of your medicines      Our records show that you are taking the medicines listed below. If these are incorrect, please call your family doctor or clinic.        Dose / Directions Last dose taken    albuterol (2.5 MG/3ML) 0.083% neb solution   Dose:  2.5 mg        Inhale 2.5 mg into the lungs   Refills:  0        proteinase inhibitor 500 MG Solr   Commonly known as:  PROLASTIN   Dose:  60 mg/kg        Inject 60 mg/kg into the vein once a week   Refills:  0        sertraline 50 MG tablet   Commonly known as:  ZOLOFT        Take by mouth daily   Refills:  0        SYMBICORT 160-4.5 MCG/ACT Inhaler   Dose:  2 puff   Generic drug:  budesonide-formoterol        Inhale 2 puffs into the lungs   Refills:  0        * tamsulosin 0.4 MG capsule   Commonly known as:  FLOMAX   Dose:  0.4 mg   Quantity:  30 capsule        Take 1 capsule (0.4 mg) by mouth daily   Refills:  11        * tamsulosin 0.4 MG capsule   Commonly known as:  FLOMAX   Dose:  0.4 mg        Take 0.4 mg by mouth daily   Refills:  0        tiotropium 18 MCG capsule   Commonly known as:  SPIRIVA   Dose:  18 mcg        Inhale 18 mcg into the lungs daily   Refills:  0        * Notice:  This list has 2 medication(s) that are the same as other medications prescribed for you. Read the directions carefully, and ask your doctor or other care provider to review them with you.            Procedures and tests performed during your visit     CBC with platelets differential    Comprehensive metabolic panel    EKG 12-lead, tracing only    Troponin I      Orders Needing Specimen Collection      None      Pending Results     No orders found from 11/19/2018 to 11/22/2018.            Pending Culture Results     No orders found from 11/19/2018 to 11/22/2018.            Pending Results Instructions     If you had any lab results that were not finalized at the time of your Discharge, you can call the ED Lab Result RN at [phone]. You will be contacted by this team for any positive Lab results or changes in treatment. The nurses are available 7 days a week from 10A to 6:30P.  You can leave a message 24 hours per day and they will return your call.        Test Results From Your Hospital Stay        11/21/2018  4:52 AM      Component Results     Component Value Ref Range & Units Status    WBC 11.3 (H) 4.0 - 11.0 10e9/L Final    RBC Count 4.36 (L) 4.4 - 5.9 10e12/L Final    Hemoglobin 14.6 13.3 - 17.7 g/dL Final    Hematocrit 45.2 40.0 - 53.0 % Final     (H) 78 - 100 fl Final    MCH 33.5 (H) 26.5 - 33.0 pg Final    MCHC 32.3 31.5 - 36.5 g/dL Final    RDW 12.5 10.0 - 15.0 % Final    Platelet Count 192 150 - 450 10e9/L Final    Diff Method Automated Method  Final    % Neutrophils 74.1 % Final    % Lymphocytes 15.2 % Final    % Monocytes 9.6 % Final    % Eosinophils 0.5 % Final    % Basophils 0.2 % Final    % Immature Granulocytes 0.4 % Final    Nucleated RBCs 0 0 /100 Final    Absolute Neutrophil 8.4 (H) 1.6 - 8.3 10e9/L Final    Absolute Lymphocytes 1.7 0.8 - 5.3 10e9/L Final    Absolute Monocytes 1.1 0.0 - 1.3 10e9/L Final    Absolute Eosinophils 0.1 0.0 - 0.7 10e9/L Final    Absolute Basophils 0.0 0.0 - 0.2 10e9/L Final    Abs Immature Granulocytes 0.0 0 - 0.4 10e9/L Final    Absolute Nucleated RBC 0.0  Final         11/21/2018  5:12 AM      Component Results     Component Value Ref Range & Units Status    Sodium 145 (H) 133 - 144 mmol/L Final    Potassium 4.4 3.4 - 5.3 mmol/L Final    Chloride 109 94 - 109 mmol/L Final    Carbon Dioxide 30 20 - 32 mmol/L Final    Anion Gap 6 3 - 14 mmol/L Final     "Glucose 105 (H) 70 - 99 mg/dL Final    Urea Nitrogen 30 7 - 30 mg/dL Final    Creatinine 0.86 0.66 - 1.25 mg/dL Final    GFR Estimate >90 >60 mL/min/1.7m2 Final    Non  GFR Calc    GFR Estimate If Black >90 >60 mL/min/1.7m2 Final    African American GFR Calc    Calcium 8.6 8.5 - 10.1 mg/dL Final    Bilirubin Total 0.4 0.2 - 1.3 mg/dL Final    Albumin 3.6 3.4 - 5.0 g/dL Final    Protein Total 7.1 6.8 - 8.8 g/dL Final    Alkaline Phosphatase 87 40 - 150 U/L Final    ALT 33 0 - 70 U/L Final    AST 36 0 - 45 U/L Final         2018  5:12 AM      Component Results     Component Value Ref Range & Units Status    Troponin I ES 0.027 0.000 - 0.045 ug/L Final    The 99th percentile for upper reference range is 0.045 ug/L.  Troponin values   in the range of 0.045 - 0.120 ug/L may be associated with risks of adverse   clinical events.                  Thank you for choosing [facility]       Thank you for choosing [facility] for your care. Our goal is always to provide you with excellent care. Hearing back from our patients is one way we can continue to improve our services. Please take a few minutes to complete the written survey that you may receive in the mail after you visit with us. Thank you!        [employer] Information     [employer] lets you send messages to your doctor, view your test results, renew your prescriptions, schedule appointments and more. To sign up, go to [email or web address] . Click on \"Log in\" on the left side of the screen, which will take you to the Welcome page. Then click on \"Sign up Now\" on the right side of the page.     You will be asked to enter the access code listed below, as well as some personal information. Please follow the directions to create your username and password.     Your access code is: [email or web address]  Expires: 2019  5:59 AM     Your access code will  in 90 days. If you need help or a new code, please call your [facility] clinic or [phone].        Care " EveryWhere ID     This is your Care EveryWhere ID. This could be used by other organizations to access your [place] medical records  [id number]        Equal Access to Services     PHIL GARCIA : Stone Muñiz, suha guaman, jess baugh, juan gonzalez. So [facility] [phone].    ATENCIÓN: Si habla español, tiene a heller disposición servicios gratuitos de asistencia lingüística. Llame al [phone].    We comply with applicable federal civil rights laws and [place] laws. We do not discriminate on the basis of race, color, national origin, age, disability, sex, sexual orientation, or gender identity.            After Visit Summary       This is your record. Keep this with you and show to your community pharmacist(s) and doctor(s) at your next visit.

## 2018-11-23 ENCOUNTER — TELEPHONE (OUTPATIENT)
Dept: PULMONOLOGY | Facility: CLINIC | Age: 58
End: 2018-11-23

## 2018-11-23 NOTE — TELEPHONE ENCOUNTER
Received message from Dr. Griffith to schedule pt ASAP. Scheduled pt for 11.26.18 with Dr. Cronin. Called pts home phone number listed in EPIC. Not a valid number. Called daughter's number, REAGAN Martel with my CB#. Called daughter again with no answer. Phone calls took place on 11.21.18. Then on 11.23.18 called daughter again with no answer. Called Liliane Romero () and she is out of office until 11.27.18. Called Rylee (RN who gives pt infusion) and she stated Sg doesn't have a phone and doesn't have contact with his daughter. Rylee stated she doesn't have contact number for pt. Rylee took down Sg's appt time and date and if pt calls Rylee she will deliver information. I told Rylee if I don't hear anything by the end of today I will cancel appt.

## 2020-04-30 ENCOUNTER — TRANSFERRED RECORDS (OUTPATIENT)
Dept: HEALTH INFORMATION MANAGEMENT | Facility: CLINIC | Age: 60
End: 2020-04-30

## 2020-05-13 ENCOUNTER — REFERRAL (OUTPATIENT)
Dept: TRANSPLANT | Facility: CLINIC | Age: 60
End: 2020-05-13

## 2020-05-13 DIAGNOSIS — J44.9 COPD (CHRONIC OBSTRUCTIVE PULMONARY DISEASE) (H): Primary | ICD-10-CM

## 2020-05-13 DIAGNOSIS — E88.01 ALPHA-1-ANTITRYPSIN DEFICIENCY (H): ICD-10-CM

## 2020-05-13 NOTE — LETTER
May 21, 2020        Sg Quintana  [address] [facility] [address]        Dear Sg,       You have recently expressed interest in our Solid Organ Transplant Program and have requested to begin the evaluation process.  We have made several attempts to get in touch with you but have been unable to reach you.    If you are still interested and/or have any questions, please contact us at  [phone] or [phone]   Monday - Friday, between the hours of 8:30am and 5:00pm central time.    We look forward to hearing from you.      Regards,     Solid Organ Transplant Intake   [facility]  [address] [place] [address]  [address], [facility] [address]  [place] [address]

## 2020-05-13 NOTE — LETTER
November 16, 2022    From:  [facility]    To: Mr. Quintana  [address]  [facility] [address]    Dear Sg Quintana,  Dr. Bunny Moore referred you to our program for consideration of lung transplant evaluation. Our transplant program has not had contact with you in a substantial amount of time and we have closed your transplant referral for consideration of lung transplant at this time.    Thank you for considering the [facility] Transplant Program for your health care needs. Please feel free to contact us at [phone] if you or your physician would like to discuss our decision or with any concerns.     Sincerely,  [facility]    CC Primary Care Provider        Referring Provider

## 2020-05-13 NOTE — LETTER
Jacqueline 15, 2020    Sg Quintana  [address] [facility] [address]    Dear Sg,    Thank you for your interest in the Transplant Center at [facility]. We look forward to being a part of your care team and assisting you through the transplant process.    As we discussed, your transplant coordinator is Amparo Almendarez (Lung).  You may call your coordinator at any time with questions or concerns. [phone].    Please complete the following.    1. Fill out and return the enclosed forms    Authorization for Electronic Communication    Authorization to Discuss Protected Health Information    Authorization for Release of Protected Health Information    Authorization for Care Everywhere Release of Information    2. Sign up for:    [employer], access to your electronic medical record (see enclosed pamphlet)    [employer], a transplant education website    You can use these tools to learn more about your transplant, communicate with your care team, and track your medical details      Sincerely,      Solid Organ Transplant  [facility], [facility]    cc: Referring Physician

## 2020-05-28 VITALS — BODY MASS INDEX: 28 KG/M2 | WEIGHT: 200 LBS | HEIGHT: 71 IN

## 2020-05-28 ASSESSMENT — MIFFLIN-ST. JEOR: SCORE: 1744.32

## 2020-05-28 NOTE — TELEPHONE ENCOUNTER
Patient Call: General  Route to LPN    Reason for call: patient returning call    Call back needed? Yes    Return Call Needed  Same as documented in contacts section  When to return call?: Same day: Route High Priority

## 2020-05-28 NOTE — TELEPHONE ENCOUNTER
[facility]OT LUNG INTAKE    May 28, 2020    [facility]    Referring Provider:Bunny Oliveros  Primary Care Provider: no  Specialist: no  Source/Facility:[facility]  Diagnosis: COPD  Alpha-1-antitrypsin deficiency, Emphysema    Smoking/nicotine use history: no  Alcohol use history: no  Drug use history: no  Cancer history: no  Cardiac history: no  BMI: 27.9  Oxygen use at rest: 2 LPM with activity: 4      Comments    Is patient in a group home/assisted living? no  Does patient have a guardian? no    Referral intake process completed.  Patient is aware that after financial approval is received, medical records will be requested.   Patient confirmed for a callback from transplant coordinator on Jacqueline 10, 2020. (within 2 weeks)      Confirmed coordinator will discuss evaluation process in more detail at the time of their call.   Patient is aware of the need to arrange age appropriate cancer screening, vaccinations, and dental care.  Reminded patient to complete questionnaire, complete medical records release, and review packet prior to evaluation visit .  Assessed patient for special needs (ie--wheelchair, assistance, guardian, and ):  none   Patient instructed to call [phone] with questions.

## 2020-06-01 ENCOUNTER — VIRTUAL VISIT (OUTPATIENT)
Dept: FAMILY MEDICINE | Facility: CLINIC | Age: 60
End: 2020-06-01
Payer: COMMERCIAL

## 2020-06-01 DIAGNOSIS — J43.9 PULMONARY EMPHYSEMA, UNSPECIFIED EMPHYSEMA TYPE (H): Primary | ICD-10-CM

## 2020-06-01 DIAGNOSIS — N13.8 BPH WITH URINARY OBSTRUCTION: ICD-10-CM

## 2020-06-01 DIAGNOSIS — F32.A ANXIETY AND DEPRESSION: ICD-10-CM

## 2020-06-01 DIAGNOSIS — N40.1 BPH WITH URINARY OBSTRUCTION: ICD-10-CM

## 2020-06-01 DIAGNOSIS — F41.9 ANXIETY AND DEPRESSION: ICD-10-CM

## 2020-06-01 PROCEDURE — 99204 OFFICE O/P NEW MOD 45 MIN: CPT | Mod: 95 | Performed by: FAMILY MEDICINE

## 2020-06-01 RX ORDER — ALBUTEROL SULFATE 90 UG/1
2 AEROSOL, METERED RESPIRATORY (INHALATION) EVERY 6 HOURS
Qty: 18 G | Refills: 11 | Status: SHIPPED | OUTPATIENT
Start: 2020-06-01

## 2020-06-01 RX ORDER — TAMSULOSIN HYDROCHLORIDE 0.4 MG/1
0.4 CAPSULE ORAL DAILY
Qty: 90 CAPSULE | Refills: 3 | Status: SHIPPED | OUTPATIENT
Start: 2020-06-01 | End: 2022-06-02

## 2020-06-01 RX ORDER — ESCITALOPRAM OXALATE 20 MG/1
20 TABLET ORAL DAILY
COMMUNITY
End: 2020-06-01

## 2020-06-01 RX ORDER — ALBUTEROL SULFATE 90 UG/1
2 AEROSOL, METERED RESPIRATORY (INHALATION) EVERY 6 HOURS
COMMUNITY
End: 2020-06-01

## 2020-06-01 RX ORDER — BUDESONIDE AND FORMOTEROL FUMARATE DIHYDRATE 160; 4.5 UG/1; UG/1
2 AEROSOL RESPIRATORY (INHALATION) 2 TIMES DAILY
Qty: 1 INHALER | Refills: 11 | Status: SHIPPED | OUTPATIENT
Start: 2020-06-01

## 2020-06-01 RX ORDER — ESCITALOPRAM OXALATE 10 MG/1
30 TABLET ORAL DAILY
Qty: 90 TABLET | Refills: 1 | Status: SHIPPED | OUTPATIENT
Start: 2020-06-01 | End: 2020-06-05

## 2020-06-01 ASSESSMENT — PATIENT HEALTH QUESTIONNAIRE - PHQ9
5. POOR APPETITE OR OVEREATING: NOT AT ALL
SUM OF ALL RESPONSES TO PHQ QUESTIONS 1-9: 6

## 2020-06-01 ASSESSMENT — ANXIETY QUESTIONNAIRES
1. FEELING NERVOUS, ANXIOUS, OR ON EDGE: NOT AT ALL
IF YOU CHECKED OFF ANY PROBLEMS ON THIS QUESTIONNAIRE, HOW DIFFICULT HAVE THESE PROBLEMS MADE IT FOR YOU TO DO YOUR WORK, TAKE CARE OF THINGS AT HOME, OR GET ALONG WITH OTHER PEOPLE: NOT DIFFICULT AT ALL
7. FEELING AFRAID AS IF SOMETHING AWFUL MIGHT HAPPEN: NOT AT ALL
5. BEING SO RESTLESS THAT IT IS HARD TO SIT STILL: NOT AT ALL
3. WORRYING TOO MUCH ABOUT DIFFERENT THINGS: NOT AT ALL
6. BECOMING EASILY ANNOYED OR IRRITABLE: NOT AT ALL
2. NOT BEING ABLE TO STOP OR CONTROL WORRYING: NOT AT ALL
GAD7 TOTAL SCORE: 0

## 2020-06-01 NOTE — PROGRESS NOTES
"Sg Quintana is a 60 year old male who is being evaluated via a billable video visit.      The patient has been notified of following:     \"This video visit will be conducted via a call between you and your physician/provider. We have found that certain health care needs can be provided without the need for an in-person physical exam.  This service lets us provide the care you need with a video conversation.  If a prescription is necessary we can send it directly to your pharmacy.  If lab work is needed we can place an order for that and you can then stop by our lab to have the test done at a later time.    Video visits are billed at different rates depending on your insurance coverage.  Please reach out to your insurance provider with any questions.    If during the course of the call the physician/provider feels a video visit is not appropriate, you will not be charged for this service.\"    Patient has given verbal consent for Video visit? Yes    How would you like to obtain your AVS? Mail a copy    Patient would like the video invitation sent by: Send to e-mail at: [email or web address]    Will anyone else be joining your video visit? No    Subjective     Sg Quintana is a 60 year old male who presents today via video visit for the following health issues:    HPI  Depression Followup    How are you doing with your depression since your last visit? No change    Are you having other symptoms that might be associated with depression? No    Have you had a significant life event?  Health Concerns     Are you feeling anxious or having panic attacks?   Yes:  anxiety and panic attacks    Do you have any concerns with your use of alcohol or other drugs? No    Social History     Tobacco Use     Smoking status: Never Smoker     Smokeless tobacco: Never Used   Substance Use Topics     Alcohol use: No     Drug use: No     PHQ 6/1/2020   PHQ-9 Total Score 6   Q9: Thoughts of better off dead/self-harm past 2 weeks Not at all "     GEETA-7 SCORE 6/1/2020   Total Score 0     Last PHQ-9 6/1/2020   1.  Little interest or pleasure in doing things 0   2.  Feeling down, depressed, or hopeless 0   3.  Trouble falling or staying asleep, or sleeping too much 3   4.  Feeling tired or having little energy 0   5.  Poor appetite or overeating 3   6.  Feeling bad about yourself 0   7.  Trouble concentrating 0   8.  Moving slowly or restless 0   Q9: Thoughts of better off dead/self-harm past 2 weeks 0   PHQ-9 Total Score 6   Difficulty at work, home, or with people Not difficult at all     GEETA-7  6/1/2020   1. Feeling nervous, anxious, or on edge 0   2. Not being able to stop or control worrying 0   3. Worrying too much about different things 0   4. Trouble relaxing 0   5. Being so restless that it is hard to sit still 0   6. Becoming easily annoyed or irritable 0   7. Feeling afraid, as if something awful might happen 0   GEETA-7 Total Score 0   If you checked any problems, how difficult have they made it for you to do your work, take care of things at home, or get along with other people? Not difficult at all     In the past two weeks have you had thoughts of suicide or self-harm?  No.    Do you have concerns about your personal safety or the safety of others?   No    Suicide Assessment Five-step Evaluation and Treatment (SAFE-T)    COPD Follow-Up    Overall, how are your COPD symptoms since your last clinic visit?  little worse    How much fatigue or shortness of breath do you have when you are walking?  More than usual    How much shortness of breath do you have when you are resting?  Same as usual    How often do you cough? Often    Have you noticed any change in your sputum/phlegm?  No    Have you experienced a recent fever? No    Please describe how far you can walk without stopping to rest:  Less than 1 block    How many flights of stairs are you able to walk up without stopping?  2  Have you had any Emergency Room Visits, Urgent Care Visits, or  "Hospital Admissions because of your COPD since your last office visit?  Yes      How many times have you gone to the ED, Urgent Care, or been hospitalized for COPD since your last clinic visit?  1    History   Smoking Status     Never Smoker   Smokeless Tobacco     Never Used     No results found for: FEV1, NOV8EXS      How many servings of fruits and vegetables do you eat daily?  0-1    On average, how many sweetened beverages do you drink each day (Examples: soda, juice, sweet tea, etc.  Do NOT count diet or artificially sweetened beverages)?   1    How many days per week do you exercise enough to make your heart beat faster? 7    How many minutes a day do you exercise enough to make your heart beat faster? 10 - 19    How many days per week do you miss taking your medication? 0         Video Start Time: 1140    Patient reports being diagnosed with BPH with urinary obstruction. Controlled with flomax.    Patient reports been on lexapro 40 mg for many years - prescribed by his \"specialist\".  Patient denies any side effect to this high of a dose of the med.    [facility] and alpha-1 antitrypsin deficiency.  Stable with his inhaler to date per his report.      Patient Active Problem List   Diagnosis     Urinary obstruction     CARDIOVASCULAR SCREENING; LDL GOAL LESS THAN 160     Alpha-1-antitrypsin deficiency (H)     COPD (chronic obstructive pulmonary disease) (H)     Pulmonary emphysema (H)     Personal history of tobacco use, presenting hazards to health     Vitamin D deficiency     Past Surgical History:   Procedure Laterality Date     COLONOSCOPY  2015    [place]       Social History     Tobacco Use     Smoking status: Never Smoker     Smokeless tobacco: Never Used   Substance Use Topics     Alcohol use: No     Family History   Problem Relation Age of Onset     Cancer Mother         lung     Cancer Father         bone     Diabetes Paternal Grandmother          Current Outpatient " "Medications   Medication Sig Dispense Refill     albuterol (2.5 MG/3ML) 0.083% neb solution Inhale 2.5 mg into the lungs       albuterol (PROAIR HFA/PROVENTIL HFA/VENTOLIN HFA) 108 (90 Base) MCG/ACT inhaler Inhale 2 puffs into the lungs every 6 hours 18 g 11     budesonide-formoterol (SYMBICORT) 160-4.5 MCG/ACT Inhaler Inhale 2 puffs into the lungs 2 times daily 1 Inhaler 11     escitalopram (LEXAPRO) 10 MG tablet Take 3 tablets (30 mg) by mouth daily 2 tablets daily 90 tablet 1     proteinase inhibitor (PROLASTIN) 500 MG SOLR Inject 60 mg/kg into the vein once a week       tamsulosin (FLOMAX) 0.4 MG capsule Take 1 capsule (0.4 mg) by mouth daily 90 capsule 3     tiotropium (SPIRIVA) 18 MCG inhalation capsule Inhale 18 mcg into the lungs daily       sertraline (ZOLOFT) 50 MG tablet Take by mouth daily       tamsulosin (FLOMAX) 0.4 MG 24 hr capsule Take 1 capsule (0.4 mg) by mouth daily 30 capsule 11     Allergies   Allergen Reactions     Clindamycin Other (See Comments), Hives and Rash     Rash all over chest  Per pt report           Reviewed and updated as needed this visit by Provider  Tobacco  Allergies  Meds  Problems  Med Hx  Surg Hx  Fam Hx         Review of Systems   Constitutional, HEENT, cardiovascular, pulmonary, GI, , musculoskeletal, neuro, skin, endocrine and psych systems are negative, except as otherwise noted.      Objective    There were no vitals taken for this visit.  Estimated body mass index is 27.89 kg/m  as calculated from the following:    Height as of 5/28/20: 1.803 m (5' 11\").    Weight as of 5/28/20: 90.7 kg (200 lb).  Physical Exam     GENERAL: Healthy, alert and no distress  EYES: Eyes grossly normal to inspection.  No discharge or erythema, or obvious scleral/conjunctival abnormalities.  HENT: no nasal flaring, nasal cannula in place  RESP: No audible wheeze, cough, or visible cyanosis.  No visible retractions or increased work of breathing.    SKIN: Visible skin clear. No " significant rash, abnormal pigmentation or lesions.  NEURO: Cranial nerves grossly intact.  Mentation and speech appropriate for age.  PSYCH: Mentation appears normal, affect normal/bright, judgement and insight intact, normal speech and appearance well-groomed.      Diagnostic Test Results:  none         Assessment & Plan     Sg was seen today for copd and anxiety.    Diagnoses and all orders for this visit:    Pulmonary emphysema, unspecified emphysema type (H)  -     albuterol (PROAIR HFA/PROVENTIL HFA/VENTOLIN HFA) 108 (90 Base) MCG/ACT inhaler; Inhale 2 puffs into the lungs every 6 hours  -     budesonide-formoterol (SYMBICORT) 160-4.5 MCG/ACT Inhaler; Inhale 2 puffs into the lungs 2 times daily  Stable per patient.  Sees pulmonology.    BPH with urinary obstruction  -     tamsulosin (FLOMAX) 0.4 MG capsule; Take 1 capsule (0.4 mg) by mouth daily  Stable per patient.    Anxiety and depression  -     escitalopram (LEXAPRO) 10 MG tablet; Take 3 tablets (30 mg) by mouth daily 2 tablets daily  Discussed recommended max daily dose of escitalopram. Even 30 mg daily is off-label use.  Discussed plan to reduce dose to 30 mg daily for next 2 months since he has been stable. Aim to goal of 20 mg maintenance. Patient concurs.  Recheck in 2 months.  Advised reasons to contact the care team.        Patient Instructions   Your medications have been refilled.    Escitalopram has been decreased to 30mg daily since the recommended maximum daily dose is 20 mg. Plan to keep you at 30 mg for now, then reevaluate your condition in 2 months.    See your lung specialist as you have been doing.    If you have any worsening of your condition or if you have new symptoms, see a doctor promptly.    Schedule clinic visit for a preventive health exam in August 2020.      Return in about 2 months (around 8/1/2020).    Radhames Grgisby MD  [facility]      Video-Visit Details    Type of service:  Video Visit    Video End  Time:1151    Originating Location (pt. Location): Home    Distant Location (provider location):  [facility]     Platform used for Video Visit: Doximity    Return in about 2 months (around 8/1/2020).       Radhames Grigsby MD

## 2020-06-01 NOTE — PATIENT INSTRUCTIONS
Your medications have been refilled.    Escitalopram has been decreased to 30mg daily since the recommended maximum daily dose is 20 mg. Plan to keep you at 30 mg for now, then reevaluate your condition in 2 months.    See your lung specialist as you have been doing.    If you have any worsening of your condition or if you have new symptoms, see a doctor promptly.    Schedule clinic visit for a preventive health exam in August 2020.

## 2020-06-02 ENCOUNTER — TELEPHONE (OUTPATIENT)
Dept: FAMILY MEDICINE | Facility: CLINIC | Age: 60
End: 2020-06-02

## 2020-06-02 DIAGNOSIS — F32.A ANXIETY AND DEPRESSION: ICD-10-CM

## 2020-06-02 DIAGNOSIS — F41.9 ANXIETY AND DEPRESSION: ICD-10-CM

## 2020-06-02 ASSESSMENT — ANXIETY QUESTIONNAIRES: GAD7 TOTAL SCORE: 0

## 2020-06-03 NOTE — TELEPHONE ENCOUNTER
PRIOR AUTHORIZATION DENIED    Medication: escitalopram (LEXAPRO) 10 MG tablet- DENIED    Denial Date: 6/3/2020    Denial Rational:             Appeal Information:

## 2020-06-03 NOTE — TELEPHONE ENCOUNTER
Prior Authorization Retail Medication Request    Medication/Dose: escitalopram  ICD code (if different than what is on RX):    Previously Tried and Failed:  lexapro 20 mg; zoloft 50 mg  Rationale:      Insurance Name:  BC/BS  Insurance ID:  Not provided  Surescript: [id number]  [facility] Key: [id number]    Pharmacy Information (if different than what is on RX)  Name:    Phone:

## 2020-06-03 NOTE — TELEPHONE ENCOUNTER
Central Prior Authorization Team   Phone: [phone]      PA Initiation    Medication: escitalopram (LEXAPRO) 10 MG tablet   Insurance Company: [employer] - Phone [phone] Fax [phone]  Pharmacy Filling the Rx: [facility] [address] [place] [address] - [address]  Filling Pharmacy Phone: [phone]  Filling Pharmacy Fax:    Start Date: 6/3/2020

## 2020-06-05 RX ORDER — ESCITALOPRAM OXALATE 20 MG/1
20 TABLET ORAL DAILY
Qty: 30 TABLET | Refills: 11 | Status: SHIPPED | OUTPATIENT
Start: 2020-06-05 | End: 2020-06-09

## 2020-06-05 NOTE — TELEPHONE ENCOUNTER
Changed escitalopram Rx to the maximum listed recommended dose of 20 mg daily.  Noted that patient has sertraline on his list. He should not be taking that if already taking escitalopram.

## 2020-06-05 NOTE — TELEPHONE ENCOUNTER
Dr. Grigsby  PA for Lexapro has been denied.    Patient can only received 30 tabs per 30 days.  Patient is taking 90 per 30 days.  Sig - Route: Take 3 tablets (30 mg) by mouth daily 2 tablets daily - Oral        Lissa Gomez

## 2020-06-06 NOTE — TELEPHONE ENCOUNTER
Dr. Grigsby,   Sig for Lexapro indicates:  Sig - Route: Take 1 tablet (20 mg) by mouth daily 2 tablets daily - Oral    Should patient be taking 2 tablets daily?  This would exceed what insurance allows.    Thank you,  Lissa Gomez

## 2020-06-08 ENCOUNTER — TELEPHONE (OUTPATIENT)
Dept: FAMILY MEDICINE | Facility: CLINIC | Age: 60
End: 2020-06-08

## 2020-06-08 DIAGNOSIS — F41.9 ANXIETY AND DEPRESSION: ICD-10-CM

## 2020-06-08 DIAGNOSIS — F32.A ANXIETY AND DEPRESSION: ICD-10-CM

## 2020-06-08 RX ORDER — ESCITALOPRAM OXALATE 10 MG/1
10 TABLET ORAL DAILY
Qty: 30 TABLET | Refills: 1 | Status: SHIPPED | OUTPATIENT
Start: 2020-06-08 | End: 2022-05-09

## 2020-06-08 NOTE — TELEPHONE ENCOUNTER
Provider covering for Dr. Grigsby,    Virtual visit 6/1/2020-    Anxiety and depression  -     escitalopram (LEXAPRO) 10 MG tablet; Take 3 tablets (30 mg) by mouth daily 2 tablets daily  Discussed recommended max daily dose of escitalopram. Even 30 mg daily is off-label use.  Discussed plan to reduce dose to 30 mg daily for next 2 months since he has been stable. Aim to goal of 20 mg maintenance. Patient concurs.  Recheck in 2 months.  Advised reasons to contact the care team.      Prescription given for Lexapro says 20 mg tabs take 2 a day.  I have pended for Lexapro 10 mg according to the visit notes. Lashaun CASTILLO RN

## 2020-06-08 NOTE — TELEPHONE ENCOUNTER
Patient called and said he will take one 20mg pill and 1 10 mg pill daily. Dania Poe on 6/8/2020 at 12:24 PM

## 2020-06-08 NOTE — TELEPHONE ENCOUNTER
Patient called with the new lexapro 10 mg to go with his lexapro 20 mg.  F/u in 2 months to talk about reduction to 20 mg only. Lashaun CASTILLO RN

## 2020-06-08 NOTE — TELEPHONE ENCOUNTER
If he has 10 mg tabs he can take 2 daily.  IF they are 20 mg tabs, he can take one daily.  Please check with patient on his dosing and if Rx needs to be changed let me know.  Minna Hunter NP on 6/8/2020 at 11:38 AM

## 2020-06-08 NOTE — TELEPHONE ENCOUNTER
Provider covering for Dr. Grigsby,    Please see message below about qty of medication and advise. Lashaun CASTILLO RN

## 2020-06-08 NOTE — TELEPHONE ENCOUNTER
Reason for Call:  Other prescription    Detailed comments: Hailey from [facility] pharmacy is calling asking for clarification on   escitalopram (LEXAPRO) 20 MG tablet  30 tablet  11  6/5/2020   No    Sig - Route: Take 1 tablet (20 mg) by mouth daily 2 tablets daily -         Phone Number Patient can be reached at: Home number on file [phone] (home)    Best Time: any    Can we leave a detailed message on this number? YES    Call taken on 6/8/2020 at 11:52 AM by Yessica Dasilva

## 2020-06-08 NOTE — TELEPHONE ENCOUNTER
Order sent for additional 10 mg tab.  Follow instructions to wean back down to 20 mg daily.  Minna Hunter NP on 6/8/2020 at 2:36 PM

## 2020-06-09 RX ORDER — ESCITALOPRAM OXALATE 20 MG/1
20 TABLET ORAL DAILY
Qty: 30 TABLET | Refills: 11 | Status: SHIPPED | OUTPATIENT
Start: 2020-06-09 | End: 2022-05-09

## 2020-06-09 NOTE — TELEPHONE ENCOUNTER
Resent 20 mg Lexapro after talking to [facility] pharmacist to take with the 10 mg to equal 30 mg.  This was filled for 1 year since he will be taking this ongoing and weaning down to this as maintenance dose.  I only filled the 10 mg tabs for 2 months and if he needs refills he will need to follow-up in clinic for evaluation on how things are going.  Minna Hunter NP on 6/9/2020 at 9:49 AM

## 2020-06-10 ENCOUNTER — TELEPHONE (OUTPATIENT)
Dept: TRANSPLANT | Facility: CLINIC | Age: 60
End: 2020-06-10

## 2020-06-10 NOTE — TELEPHONE ENCOUNTER
Contacted patient for RN lung intake call scheduled by intake team. NO answer. Left a voice mail requesting a return call. Provided [facility] Office call back information.Awaiting call back.

## 2020-06-22 ENCOUNTER — TELEPHONE (OUTPATIENT)
Dept: TRANSPLANT | Facility: CLINIC | Age: 60
End: 2020-06-22

## 2020-06-22 NOTE — TELEPHONE ENCOUNTER
First attempt to connect for RN intake call completed by Venu Almendarez RN.    Second attempt made today. No answer and left message requesting call back.

## 2020-06-29 ENCOUNTER — TELEPHONE (OUTPATIENT)
Dept: TRANSPLANT | Facility: CLINIC | Age: 60
End: 2020-06-29

## 2020-06-29 NOTE — TELEPHONE ENCOUNTER
Call to Sg to follow up on transplant referral. Coordinator intake call rescheduled for 0900 on 7/1/2020.
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
## 2021-05-21 ENCOUNTER — HOSPITAL ENCOUNTER (EMERGENCY)
Facility: CLINIC | Age: 61
Discharge: HOME | End: 2021-05-21
Attending: EMERGENCY MEDICINE | Admitting: EMERGENCY MEDICINE
Payer: MEDICARE

## 2021-05-21 VITALS
HEART RATE: 96 BPM | TEMPERATURE: 97.1 F | OXYGEN SATURATION: 91 % | WEIGHT: 200 LBS | BODY MASS INDEX: 27.89 KG/M2 | SYSTOLIC BLOOD PRESSURE: 140 MMHG | DIASTOLIC BLOOD PRESSURE: 71 MMHG | RESPIRATION RATE: 22 BRPM

## 2021-05-21 DIAGNOSIS — J40 BRONCHITIS: ICD-10-CM

## 2021-05-21 DIAGNOSIS — R05.9 COUGH: ICD-10-CM

## 2021-05-21 PROCEDURE — 250N000013 HC RX MED GY IP 250 OP 250 PS 637: Performed by: EMERGENCY MEDICINE

## 2021-05-21 PROCEDURE — 99284 EMERGENCY DEPT VISIT MOD MDM: CPT | Performed by: EMERGENCY MEDICINE

## 2021-05-21 PROCEDURE — 99283 EMERGENCY DEPT VISIT LOW MDM: CPT | Performed by: EMERGENCY MEDICINE

## 2021-05-21 RX ORDER — AZITHROMYCIN 250 MG/1
TABLET, FILM COATED ORAL
Qty: 6 TABLET | Refills: 0 | Status: SHIPPED | OUTPATIENT
Start: 2021-05-21 | End: 2021-05-26

## 2021-05-21 RX ORDER — AZITHROMYCIN 250 MG/1
500 TABLET, FILM COATED ORAL ONCE
Status: COMPLETED | OUTPATIENT
Start: 2021-05-21 | End: 2021-05-21

## 2021-05-21 RX ADMIN — AZITHROMYCIN MONOHYDRATE 500 MG: 250 TABLET ORAL at 19:37

## 2021-05-21 ASSESSMENT — ENCOUNTER SYMPTOMS
ABDOMINAL PAIN: 0
FEVER: 0
SHORTNESS OF BREATH: 0
COUGH: 1

## 2021-05-21 NOTE — ED TRIAGE NOTES
Cough started 2 days ago, pt on O2 at home at 2L normally. Pt on RA currently, did not bring O2 with. Cough productive.

## 2021-05-22 NOTE — ED NOTES
Pt reports cough for a couple of days with intermittent mucous.  Pt on 2 liters of oxygen via NC.    Pt denies any other concern at this time.   Pt requesting something to drink and eat.    Water, sandwich and chips given.

## 2021-05-27 ENCOUNTER — RECORDS - HEALTHEAST (OUTPATIENT)
Dept: ADMINISTRATIVE | Facility: CLINIC | Age: 61
End: 2021-05-27

## 2021-05-30 VITALS — WEIGHT: 185.2 LBS | BODY MASS INDEX: 27.35 KG/M2

## 2021-06-01 VITALS — BODY MASS INDEX: 23.1 KG/M2 | WEIGHT: 165 LBS | HEIGHT: 71 IN

## 2021-06-02 ENCOUNTER — REFERRAL (OUTPATIENT)
Dept: TRANSPLANT | Facility: CLINIC | Age: 61
End: 2021-06-02

## 2021-06-02 VITALS — WEIGHT: 165 LBS | BODY MASS INDEX: 23.01 KG/M2

## 2021-06-02 NOTE — LETTER
Sg Quintana  [phone] [facility] [address]                June 8, 2021      Dear Sg,       You have recently expressed interest in our Solid Organ Transplant Program and have requested to begin the evaluation process.  We have made several attempts to get in touch with you but have been unable to reach you.    If you are still interested and/or have any questions, please contact us at  [phone] or [phone]   Monday - Friday, between the hours of 8:00am and 5:00pm central time.    We look forward to hearing from you.      Regards,     Solid Organ Transplant Intake   [facility]'s [address]  [address], [facility] [address]  [place] [address]

## 2021-06-02 NOTE — LETTER
June 19, 2023    From:  [facility]    To: Mr. Quintana  [address] [facility] [address]    Dear Sg Quintana,  Thank you for considering the [facility]. Dr. Ravi Tillman referred you to our program for consideration of lung transplant evaluation. Although your lung disease may be severe enough to consider transplantation due to lack of contact with our program for a significant amount of time we have closed your referral to our lung transplant program.    Please feel free to contact us at [phone] if you remain interested in lung transplantation or if you or your physician would like to discuss our decision or with any concerns.     Sincerely,  [facility]    CC Primary Care Provider        Referring Provider

## 2021-06-02 NOTE — LETTER
Sg Quintana  [address] [facility] [address]                June 16, 2021      Dear ,       You have recently expressed interest in our Solid Organ Transplant Program and have requested to begin the evaluation process.  We have made several attempts to get in touch with you but have been unable to reach you.    If you are still interested and/or have any questions, please contact us at  [phone] or [phone]   Monday - Friday, between the hours of 8:00am and 5:00pm central time.    We look forward to hearing from you.      Regards,     Solid Organ Transplant Intake   [facility]'[address]  [address], [facility] [address]  [place] [address]

## 2021-06-08 NOTE — PROGRESS NOTES
Pt arrived per w/c for his weekly prolastin infusion. Says he is feeling about the same. Using O2 at night. Prolastin infused over his tolerated rate of 30 minutes without problem. Line flushed and needle dc'd intact. 2x2 dressing to site. Referral completed and sent with guards at d/c.  To return next week.

## 2021-06-08 NOTE — PROGRESS NOTES
Arrived for weekly Prolastin infusion. Denies any new concerns or changes since here last week, no coughing noted though while pt here this week. VSS. Prolastin infused over approx 30 minutes for pt. Tolerance and he did well with infusion while here. Left with correctional officers and referral sent with them.

## 2021-06-08 NOTE — PROGRESS NOTES
Arrived for weekly Prolastin infusion. Denies any new concerns. Has chronic cough ( denies any colored sputum or fevers or increasing dyspnea) and shortness of breath with exertion. VSS. Placed on oxygen for comfort. Prolastin infused over approx 30 minutes for pt tolerance. Pt. Appears to have tolerated infusion well while here. Left with correctional officers and referral sent with. Will return for next appt on 02/14/17

## 2021-06-08 NOTE — PROGRESS NOTES
Arrived for weekly Prolastin infusion. Denies any changes since here last week. VSS. Prolastin infused over approx 30 minutes for pt tolerance. Left with correctional officers and referral sent.

## 2021-06-08 NOTE — PROGRESS NOTES
Pt arrived per w/c for his prolastin infusion. Has occasional cough otherwise doing about the same. Prolastin infused over pt's tolerated rate of 30 minutes without problem.  Line flushed and needle dc'd intact. 2x2 dressing to site. Referral completed and sent with guards at dc.

## 2021-06-08 NOTE — PROGRESS NOTES
Pt arrived per w/c with transport for his weekly prolastin infusion. Says no new problems. Still uses O2 at night. Prolastin infused over pt's tolerated rate of 30 minutes without problem. IV dc'd intact at completion. 2x2 dressing to site. Referral completed and sent with guards at dc.

## 2021-06-08 NOTE — PROGRESS NOTES
Pt arrived for his weekly prolastin infusion.  Says no new problems. Prolastin infused over patient's tolerated rate of 30 minutes without problem. Line flushed and IV dc'd intact at completion. 2x2 dressing to site. Referral completed and sent with guards at dc.

## 2021-06-09 NOTE — PROGRESS NOTES
Arrived for weekly Prolastin infusion. Pt. States he is doing about the same, some occ coughing, usually non productive , requests oxygen for comfort which was done at 2l n/c. VSS. Prolastin infused over 30 minutes for pt tolerance. Pt. Left via w/c with correctional officers and referral sent with officers.

## 2021-06-09 NOTE — PROGRESS NOTES
Pt arrived per w/c with guards for his weekly prolastin infusion. Feeling about the same. Still uses oxygen at night and prn during day. Prolastin infused over pt's tolerated rate of 30 minutes without problem. Line flushed and IV dc'd intact at completion. 2x2 dressing to site. Referral completed and sent with guards at [place].  Will return next week.

## 2021-06-09 NOTE — PROGRESS NOTES
Pt arrived per w/c for his weekly prolastin infusion. Feeling about the same-SOB with activity. Sat 89% on arrival. O2 on at 2L while here. Prolastin infused over tolerated rate of 30 minutes without problem. Line flushed and needle dc'd intact. 2x2 dressing to site. Referral completed and sent with guards at dc.

## 2021-06-09 NOTE — PROGRESS NOTES
Pt arrived for his weekly prolastin infusion. No new problems. States breathing is the same-uses O2 prn. Prolastin infused over pt's tolerated rate of 30 minutes without problem. Needle dc'd intact at completion. 2x2 dressing to site. Referral completed and sent with guards at dc.  Will return next week.

## 2021-06-09 NOTE — PROGRESS NOTES
Pt arrived for his weekly prolastin infusion. Says no new problems. Feeling about the same. Prolastin infused without problem. IV dc'd intact at completion. 2x2 dressing to site. Referral completed and sent with guards at discharge.

## 2021-06-09 NOTE — PROGRESS NOTES
Pt arrived for his weekly prolastin infusion. States no new problems. Prolastin infused over pt's tolerated rate of 30 minutes without problem. Needle dc'd intact at completion. 2x2 dressing to site. Referral completed and sent with guards at [place]. Will return next week.

## 2021-06-09 NOTE — PROGRESS NOTES
Arrived for weekly prolastin. Pt. Denies any changes in his respiratory status. Oxygen level on room air is 87% on arrival, oxygen applied at 2l n/c with saturations 91% and then to 3 liters for comfort. States he has seen his doctor at correctional facility over the past month and nothing is new. VSS. Prolastin infused over 30 minutes for pt tolerance. Left via w/c with correctional officers and referral sent.

## 2021-06-10 NOTE — PROGRESS NOTES
Pt arrived per w/c with guards for his weekly prolastin infusion. Mentions that he will be getting out of detention soon and will only be coming here a few more times. Says they are helping him set things up for when he gets out. Looking forward to seeing his family. Prolastin infused without problem. Line flushed and needle dc'd intact. 2x2 dressing to site. Referral completed and sent with guards at [place].

## 2021-06-10 NOTE — PROGRESS NOTES
Pt arrived for his weekly prolastin. Says no new problems or meds. Prolastin infused over pt's tolerated rate of 30 minutes without problem. Line flushed and needle dc'd intact. 2x2 dressing to site. Referral completed and sent with guards at [place].

## 2021-06-10 NOTE — PROGRESS NOTES
Pt arrived for his weekly prolastin infusion. No new problems or meds. Prolastin infused over his tolerated rate of 30 minutes without problem. Line flushed and needle dc'd intact. 2x2 dressing to site. Referral completed and sent with guards at dc.

## 2021-06-10 NOTE — PROGRESS NOTES
Pt arrived for his weekly prolastin infusion. Has intermittent shortness of breath. No new problems. Prolastin infused without problem. IV dc'd intact at completion. 2x2 dressing to site. Referral completed and sent with guards at [place].

## 2021-06-10 NOTE — PROGRESS NOTES
Arrived for weekly Prolastin. States he is doing about the same with his breathing,sometimes a little more difficult.  Oxygen level this am 89 % at rest so oxygen 2 Liters nasal cannula and pt states this did help his breathing. Afebrile. Lungs have diminished breath sounds throughout. Prolastin infused over 30 minutes for his tolerance. States he is ok after infusion. Referral send with correctional officers and pt left via w/c with 2 officers.

## 2021-06-10 NOTE — PROGRESS NOTES
Arrived for weekly Prolastin infusion. Denies any changes since here last week. Occ tight npc. When asked if he has any wheezing he states not often. VSS. Oxygen level 88% on arrival and after placed on 2l nc level to 90%.Pt states his oxygen is checked periodically at correctional facility. Appears comfortable at rest. Eating breakfast while here.Prolastin infused over approx 30 minutes for his tolerance. At completion iv removed and pt left with correctional officers with referral.

## 2021-06-10 NOTE — PROGRESS NOTES
Pt arrived per w/c with guards for his weekly prolastin infusion. Feeling the same. Continues to use oxygen at night and prn during day. Looking forward to being released from correction facility soon. Will be setting up his infusions when he gets out. Prolastin infused over tolerated rate of 30 minutes without problem. Line flushed and IV dc'd intact. 2x2 dressing to site. Referral completed and sent with guards at [place]. Left per w/c with guards at 0955.

## 2021-06-11 NOTE — PROGRESS NOTES
Pt arrived per w/c for guards for his last scheduled prolastin infusion here. Will be discharged from correctional facility and will set up his continuing outpatient infusions when he gets home. Prolastin infused over his tolerated rate of 30 minutes without problem. Line flushed and needle dc'd intact. 2x2 dressing to site. Referral completed and sent with guards at dc.

## 2021-06-20 NOTE — PROGRESS NOTES
Pt here for prolastin.  IV started with good blood return and prolastin given without complications.  IV flushed with NS and then DC'd. Site covered with gauze and coban.  Guards given DC instruction paperwork and pt left stable with the guards,

## 2021-06-20 NOTE — PROGRESS NOTES
Pt arrived at 12:54 via w/c and accompanied by two guards. Reviewed plan of care. Placed IV and used NS as the primary IV solution - infused Prolastin-C 4944 mg over 20 minutes. No ill effects noted. VSS. Ate lunch. Up to the bathroom. Left unit at 15:02. AVS included in the envelope, and sent the DOC guards. Pt plans to return in one week.    Anay Whitfield RN

## 2021-06-20 NOTE — PROGRESS NOTES
Pt here for prolostin.  IV started with good blood return and prolastin given without complications.  IV flushed with NS and then DC'd. Site covered with guaze and coban. Guards given DC instructions and pt left stable with them.

## 2021-06-20 NOTE — PROGRESS NOTES
"Pt arrived via w/c at approximately 12:55, made comfortable in the recliner in the front room. Weighed pt on unit scale, 165 lbs today. Reviewed plan of care. \"I have been on this medication for 12 years, I was off for a month when I was homeless - then I really noticed a difference - so I will be coming here for the next several weeks.\" Placed IV easily and used NS as the primary IV solution - infused Prolastin-C 4809 mg over 30 minutes and then flushed the IV line with NS 50 mls. Throughout the infusion pt was placed on O2@2L/NC - \"normally I only use oxygen at night time or if I feel I need it.\" RR 18 - 20, no coughing. VSS. Afebrile. Medication list was updated. Ate lunch. Pleasant behavior. Left unit via w/c in stable condition at 14:43 (accompanied by two guards), and is scheduled to return in one week.    Anay Whitfield RN  "

## 2021-06-20 NOTE — PROGRESS NOTES
"Pt arrived via w/c and accompanied by two guards - assisted to the recliner in the front room. Applied O2@2L/NC. Pt is alert and oriented. \"The more active I am, the more coughing I have, especially in the mornings.\" Placed IV and infused Prolastin-C 4700 mg over 25 minutes, and then flushed the IV line with NS. At completion dc'd the IV and wrapped site securely. Post infusion AVS was sent in the patient envelope and given to the guard. Left unit in stable condition at 15:15, and is scheduled to return in one week.    Anay Whitfield RN  "

## 2021-06-20 NOTE — PROGRESS NOTES
Pt arrived ambulatory at 13:02, accompanied by two guards - patient was assisted to the chair in the front room of this OP Infusion Unit. Plan of care reviewed and explained nursing cares being done. Placed IV and used NS as the primary IV solution, and administered Prolastin-C 4684 mg over 32 minutes, and then flushed the IV line with NS. At completion the IV was dc'd and site wrapped with gauze and coban. VSS. Post infusion AVS was completed and sent with the patient upon return to the correctional facility. Left unit via w/c (and instructed the guards that their patients are to be transported in a w/c) at 15:20; scheduled to return on Wednesday October 17th.    Anay Whitfield RN

## 2021-06-21 NOTE — PROGRESS NOTES
Pt arrived via the w.c with the guards , PT will be getting out of care home over the weekend, vs's, Went over today's plan of care, questions and follow up. Iv started, prolastin infused, vss IV was flushed with ns then dc after, Pt left vi the w/c with the guards at 1445  Elida Madrigal

## 2021-06-21 NOTE — PROGRESS NOTES
Pt here for prolastin. IV started with good blood return and prolastin given without complications. IV flushed with NS and then DC'd. Site covered with guaze and coban.  Guards given DC instructions.  Pt left stable in a w/c with the guards.

## 2021-06-21 NOTE — PROGRESS NOTES
"Pt arrived via w/c at 13:00, accompanied by two guards - assisted to the recliner in the front room. Attached pt to O2@2L/NC upon arrival. Reviewed plan of care and explained nursing cares being provided. Placed IV in the right anterior arm easily, and used NS as the primary IV solution. Infused Prolastin-C 4684 mg (89 mls) IV over 18 minutes. At completion the IV line was flushed with NS (100 mls), and the IV was dc'd. Ate 100% of lunch. Up to the bathroom x 1. \"I weigh myself every day, today I was 165 pounds.\" The post infusion AVS was completed and sent in the patient's gray chart, to return to the [facility]. Left unit in stable condition at 14:55, and is scheduled to return on October 25th.    Anay Whitfield RN  "

## 2022-04-08 ENCOUNTER — TELEPHONE (OUTPATIENT)
Dept: TRANSPLANT | Facility: CLINIC | Age: 62
End: 2022-04-08
Payer: MEDICARE

## 2022-04-08 NOTE — TELEPHONE ENCOUNTER
Patient Call: General  Route to LPN    Reason for call: Patient would like to continue with lung referral. Writer updated patient's demographics    Call back needed? Yes  Return Call Needed  Same as documented in contacts section

## 2022-04-20 ENCOUNTER — TELEPHONE (OUTPATIENT)
Dept: PULMONOLOGY | Facility: CLINIC | Age: 62
End: 2022-04-20
Payer: MEDICARE

## 2022-04-20 DIAGNOSIS — E88.01 ALPHA-1-ANTITRYPSIN DEFICIENCY (H): Primary | ICD-10-CM

## 2022-04-20 DIAGNOSIS — J44.9 CHRONIC OBSTRUCTIVE PULMONARY DISEASE, UNSPECIFIED COPD TYPE (H): ICD-10-CM

## 2022-04-20 NOTE — TELEPHONE ENCOUNTER
Talked with patient and scheduled CXR prior to appointment with Dr. Jane on 7/26/22. Details of appointments confirmed with patient. Patient requested mailed itinerary. Patient's address verified.

## 2022-04-27 NOTE — TELEPHONE ENCOUNTER
RECORDS RECEIVED FROM: internal /ce   DATE RECEIVED: 7.26.22    NOTES STATUS DETAILS   OFFICE NOTE from referring provider internal  Servando Griffith MD   OFFICE NOTE from other specialist Ce/internal  cristela Oliveros 12.8.21    Internal- 6.1.20 Gr[place]    DISCHARGE SUMMARY from hospital     DISCHARGE REPORT from the ER ce -    7/13/21 Agapito   5.21.21 Usama      MEDICATION LIST internal     IMAGING  (NEED IMAGES AND REPORTS)     CT SCAN     CHEST XRAY (CXR) internal  Scheduled 7.26.22    TESTS     PULMONARY FUNCTION TESTING (PFT) internal /ce  Scheduled 7.26.22   [place]- 12/8/21

## 2022-05-08 DIAGNOSIS — Z01.818 ENCOUNTER FOR PRE-TRANSPLANT EVALUATION FOR LUNG TRANSPLANT: Primary | ICD-10-CM

## 2022-05-08 DIAGNOSIS — Z76.82 ORGAN TRANSPLANT CANDIDATE: ICD-10-CM

## 2022-05-08 DIAGNOSIS — J44.9 CHRONIC OBSTRUCTIVE PULMONARY DISEASE, UNSPECIFIED COPD TYPE (H): ICD-10-CM

## 2022-05-08 PROBLEM — N40.0 BPH (BENIGN PROSTATIC HYPERPLASIA): Status: ACTIVE | Noted: 2022-05-08

## 2022-05-09 ENCOUNTER — LAB (OUTPATIENT)
Dept: LAB | Facility: CLINIC | Age: 62
End: 2022-05-09
Payer: MEDICARE

## 2022-05-09 ENCOUNTER — OFFICE VISIT (OUTPATIENT)
Dept: TRANSPLANT | Facility: CLINIC | Age: 62
End: 2022-05-09
Attending: INTERNAL MEDICINE
Payer: MEDICARE

## 2022-05-09 VITALS
HEART RATE: 67 BPM | SYSTOLIC BLOOD PRESSURE: 135 MMHG | BODY MASS INDEX: 34.17 KG/M2 | WEIGHT: 245 LBS | OXYGEN SATURATION: 89 % | DIASTOLIC BLOOD PRESSURE: 89 MMHG

## 2022-05-09 DIAGNOSIS — Z01.818 ENCOUNTER FOR PRE-TRANSPLANT EVALUATION FOR LUNG TRANSPLANT: ICD-10-CM

## 2022-05-09 DIAGNOSIS — F32.A ANXIETY AND DEPRESSION: ICD-10-CM

## 2022-05-09 DIAGNOSIS — F41.9 ANXIETY AND DEPRESSION: ICD-10-CM

## 2022-05-09 DIAGNOSIS — J44.9 CHRONIC OBSTRUCTIVE PULMONARY DISEASE, UNSPECIFIED COPD TYPE (H): ICD-10-CM

## 2022-05-09 DIAGNOSIS — Z76.82 ORGAN TRANSPLANT CANDIDATE: ICD-10-CM

## 2022-05-09 LAB
ALBUMIN SERPL-MCNC: 3.5 G/DL (ref 3.4–5)
ALP SERPL-CCNC: 92 U/L (ref 40–150)
ALT SERPL W P-5'-P-CCNC: 37 U/L (ref 0–70)
ANION GAP SERPL CALCULATED.3IONS-SCNC: 3 MMOL/L (ref 3–14)
AST SERPL W P-5'-P-CCNC: 29 U/L (ref 0–45)
BASE EXCESS BLDV CALC-SCNC: 7.5 MMOL/L (ref -7.7–1.9)
BILIRUB SERPL-MCNC: 0.5 MG/DL (ref 0.2–1.3)
BUN SERPL-MCNC: 15 MG/DL (ref 7–30)
CALCIUM SERPL-MCNC: 9.4 MG/DL (ref 8.5–10.1)
CHLORIDE BLD-SCNC: 104 MMOL/L (ref 94–109)
CO2 SERPL-SCNC: 34 MMOL/L (ref 20–32)
CREAT SERPL-MCNC: 0.87 MG/DL (ref 0.66–1.25)
ERYTHROCYTE [DISTWIDTH] IN BLOOD BY AUTOMATED COUNT: 12.9 % (ref 10–15)
GFR SERPL CREATININE-BSD FRML MDRD: >90 ML/MIN/1.73M2
GLUCOSE BLD-MCNC: 102 MG/DL (ref 70–99)
HCO3 BLDV-SCNC: 36 MMOL/L (ref 21–28)
HCT VFR BLD AUTO: 48.5 % (ref 40–53)
HGB BLD-MCNC: 15.5 G/DL (ref 13.3–17.7)
Lab: NORMAL
MCH RBC QN AUTO: 32 PG (ref 26.5–33)
MCHC RBC AUTO-ENTMCNC: 32 G/DL (ref 31.5–36.5)
MCV RBC AUTO: 100 FL (ref 78–100)
O2/TOTAL GAS SETTING VFR VENT: 0 %
PCO2 BLDV: 66 MM HG (ref 40–50)
PERFORMING LABORATORY: NORMAL
PH BLDV: 7.35 [PH] (ref 7.32–7.43)
PLATELET # BLD AUTO: 192 10E3/UL (ref 150–450)
PO2 BLDV: 28 MM HG (ref 25–47)
POTASSIUM BLD-SCNC: 4.4 MMOL/L (ref 3.4–5.3)
PROT SERPL-MCNC: 7.5 G/DL (ref 6.8–8.8)
RBC # BLD AUTO: 4.84 10E6/UL (ref 4.4–5.9)
SODIUM SERPL-SCNC: 141 MMOL/L (ref 133–144)
SPECIMEN STATUS: NORMAL
TEST NAME: NORMAL
WBC # BLD AUTO: 8.1 10E3/UL (ref 4–11)

## 2022-05-09 PROCEDURE — 99000 SPECIMEN HANDLING OFFICE-LAB: CPT | Performed by: PATHOLOGY

## 2022-05-09 PROCEDURE — 80321 ALCOHOLS BIOMARKERS 1OR 2: CPT | Mod: 90 | Performed by: PATHOLOGY

## 2022-05-09 PROCEDURE — 85027 COMPLETE CBC AUTOMATED: CPT | Performed by: PATHOLOGY

## 2022-05-09 PROCEDURE — 80307 DRUG TEST PRSMV CHEM ANLYZR: CPT | Performed by: PATHOLOGY

## 2022-05-09 PROCEDURE — 82803 BLOOD GASES ANY COMBINATION: CPT | Performed by: PATHOLOGY

## 2022-05-09 PROCEDURE — 80323 ALKALOIDS NOS: CPT | Mod: 90 | Performed by: PATHOLOGY

## 2022-05-09 PROCEDURE — 36415 COLL VENOUS BLD VENIPUNCTURE: CPT | Performed by: PATHOLOGY

## 2022-05-09 PROCEDURE — 80053 COMPREHEN METABOLIC PANEL: CPT | Performed by: PATHOLOGY

## 2022-05-09 RX ORDER — ESCITALOPRAM OXALATE 20 MG/1
20 TABLET ORAL DAILY
Qty: 30 TABLET | Refills: 11 | COMMUNITY
Start: 2022-05-09 | End: 2022-06-02

## 2022-05-09 ASSESSMENT — PAIN SCALES - GENERAL: PAINLEVEL: NO PAIN (0)
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
## 2022-05-09 NOTE — NURSING NOTE
Chief Complaint   Patient presents with     New Patient     Pre-lung tx eval      Blood pressure 135/89, pulse 67, weight 111.1 kg (245 lb), SpO2 (!) 89 %.    Christine Fong, CMA

## 2022-05-09 NOTE — PATIENT INSTRUCTIONS
- Schedule an appointment with a primary care physician to restart your antidepressant and further evaluate your pelvic pain and leg numbness.   - Schedule a dermatology appointment to evaluate your moles, especially with your family history of melanoma  - Talk to your pulmonologist about pulmonary rehab  - Goal weight is 215 pounds in order to start your transplant evaluation.  - Stay in touch with Kateryna to update her with your progress.

## 2022-05-09 NOTE — PROGRESS NOTES
Reason for Visit  Sg Quintana is a 62 year old year old male who is being seen for No chief complaint on file.    Pulmonary HPI    The patient was seen and examined by Claude Carrasco MD     Current Outpatient Medications   Medication     albuterol (2.5 MG/3ML) 0.083% neb solution     albuterol (PROAIR HFA/PROVENTIL HFA/VENTOLIN HFA) 108 (90 Base) MCG/ACT inhaler     budesonide-formoterol (SYMBICORT) 160-4.5 MCG/ACT Inhaler     escitalopram (LEXAPRO) 10 MG tablet     escitalopram (LEXAPRO) 20 MG tablet     proteinase inhibitor (PROLASTIN) 500 MG SOLR     tamsulosin (FLOMAX) 0.4 MG 24 hr capsule     tamsulosin (FLOMAX) 0.4 MG capsule     tiotropium (SPIRIVA) 18 MCG inhalation capsule     No current facility-administered medications for this visit.     Allergies   Allergen Reactions     Clindamycin Other (See Comments), Hives and Rash     Rash all over chest  Per pt report         Past Medical History:   Diagnosis Date     BPH (benign prostatic hyperplasia)      COPD (chronic obstructive pulmonary disease) (H)      ED (erectile dysfunction)      Fracture of pubic ramus (H)      Homelessness      Uncomplicated asthma        Past Surgical History:   Procedure Laterality Date     COLONOSCOPY  2015    [place]       Social History     Socioeconomic History     Marital status: Single     Spouse name: Not on file     Number of children: Not on file     Years of education: Not on file     Highest education level: Not on file   Occupational History     Not on file   Tobacco Use     Smoking status: Never Smoker     Smokeless tobacco: Never Used   Substance and Sexual Activity     Alcohol use: No     Drug use: No     Sexual activity: Yes     Partners: Female   Other Topics Concern     Parent/sibling w/ CABG, MI or angioplasty before 65F 55M? No   Social History Narrative     Not on file     Social Determinants of Health     Financial Resource Strain: Not on file   Food Insecurity: Not on file   Transportation Needs:  "Not on file   Physical Activity: Not on file   Stress: Not on file   Social Connections: Not on file   Intimate Partner Violence: Not on file   Housing Stability: Not on file       Family History   Problem Relation Age of Onset     Cancer Mother         lung     Lung Cancer Mother      Cancer Father         bone     Lung Cancer Sister      Diabetes Paternal Grandmother          ROS Pulmonary  {[facility] MED PULM ROS:[id number]::\"Constitutional- Negative\",\"Eyes- Negative\",\"Ear, nose and throat- Negative\",\"Cardiac- Negative\",\"Pulm- See HPI\",\"GI- Negative\",\"Genitourinary- Negative\",\"Musculoskeletal- Negative\",\"Neurology- Negative\",\"Dermatology- Negative\",\"Endocrine- Negative\",\"Lymphatic- Negative\",\"Psychiatry- Negative\"}  A complete ROS was otherwise negative except as noted in the HPI.  There were no vitals taken for this visit.  Exam:   GENERAL APPEARANCE: Well developed, well nourished, alert, and in no apparent distress.  EYES: PERRL, EOMI  HENT: Nasal mucosa with no edema and no hyperemia. No nasal polyps.  EARS: Canals clear, TMs normal  MOUTH: Oral mucosa is moist, without any lesions, no tonsillar enlargement, no oropharyngeal exudate.  NECK: supple, no masses, no thyromegaly.  LYMPHATICS: No significant axillary, cervical, or supraclavicular nodes.  RESP: normal percussion, good air flow throughout.  No crackles. No rhonchi. No wheezes.  CV: Normal S1, S2, regular rhythm, normal rate. No murmur.  No rub. No gallop. No LE edema.   ABDOMEN:  Bowel sounds normal, soft, nontender, no HSM or masses.   MS: extremities normal. No clubbing. No cyanosis.  SKIN: no rash on limited exam  NEURO: Mentation intact, speech normal, normal strength and tone, normal gait and stance  PSYCH: mentation appears normal. and affect normal/bright  Results:  No results found for this or any previous visit (from the past 168 hour(s)).    Assessment and plan:        "

## 2022-05-12 LAB
COTININE SERPL-MCNC: <2 NG/ML
NICOTINE SERPL-MCNC: <2 NG/ML
PLPETH BLD-MCNC: <10 NG/ML
POPETH BLD-MCNC: <10 NG/ML
TRANS-3-OH-COTININE SERPL-MCNC: <2 NG/ML

## 2022-05-14 LAB — LABCORP INTERFACED MISCELLANEOUS TEST RESULT: NORMAL

## 2022-05-15 ENCOUNTER — HEALTH MAINTENANCE LETTER (OUTPATIENT)
Age: 62
End: 2022-05-15

## 2022-06-02 ENCOUNTER — OFFICE VISIT (OUTPATIENT)
Dept: PEDIATRICS | Facility: CLINIC | Age: 62
End: 2022-06-02
Payer: MEDICARE

## 2022-06-02 VITALS
TEMPERATURE: 98 F | WEIGHT: 241.4 LBS | SYSTOLIC BLOOD PRESSURE: 116 MMHG | BODY MASS INDEX: 35.76 KG/M2 | DIASTOLIC BLOOD PRESSURE: 82 MMHG | HEART RATE: 77 BPM | RESPIRATION RATE: 16 BRPM | OXYGEN SATURATION: 91 % | HEIGHT: 69 IN

## 2022-06-02 DIAGNOSIS — F32.A ANXIETY AND DEPRESSION: ICD-10-CM

## 2022-06-02 DIAGNOSIS — N13.8 BPH WITH URINARY OBSTRUCTION: ICD-10-CM

## 2022-06-02 DIAGNOSIS — Z13.1 SCREENING FOR DIABETES MELLITUS: ICD-10-CM

## 2022-06-02 DIAGNOSIS — Z12.11 COLON CANCER SCREENING: ICD-10-CM

## 2022-06-02 DIAGNOSIS — Z00.00 ENCOUNTER FOR MEDICARE ANNUAL WELLNESS EXAM: Primary | ICD-10-CM

## 2022-06-02 DIAGNOSIS — E66.812 CLASS 2 OBESITY WITH BODY MASS INDEX (BMI) OF 36.0 TO 36.9 IN ADULT, UNSPECIFIED OBESITY TYPE, UNSPECIFIED WHETHER SERIOUS COMORBIDITY PRESENT: ICD-10-CM

## 2022-06-02 DIAGNOSIS — N40.0 BENIGN PROSTATIC HYPERPLASIA, UNSPECIFIED WHETHER LOWER URINARY TRACT SYMPTOMS PRESENT: ICD-10-CM

## 2022-06-02 DIAGNOSIS — G57.93 NEUROPATHIC PAIN OF BOTH LEGS: ICD-10-CM

## 2022-06-02 DIAGNOSIS — Z11.59 NEED FOR HEPATITIS C SCREENING TEST: ICD-10-CM

## 2022-06-02 DIAGNOSIS — B35.1 ONYCHOMYCOSIS: ICD-10-CM

## 2022-06-02 DIAGNOSIS — R79.9 ABNORMAL FINDING OF BLOOD CHEMISTRY, UNSPECIFIED: ICD-10-CM

## 2022-06-02 DIAGNOSIS — F41.9 ANXIETY AND DEPRESSION: ICD-10-CM

## 2022-06-02 DIAGNOSIS — Z23 NEED FOR TDAP VACCINATION: ICD-10-CM

## 2022-06-02 DIAGNOSIS — N40.1 BPH WITH URINARY OBSTRUCTION: ICD-10-CM

## 2022-06-02 DIAGNOSIS — L98.9 SKIN LESION: ICD-10-CM

## 2022-06-02 DIAGNOSIS — R63.5 WEIGHT GAIN: ICD-10-CM

## 2022-06-02 DIAGNOSIS — R10.2 PELVIC PAIN IN MALE: ICD-10-CM

## 2022-06-02 DIAGNOSIS — Z13.220 SCREENING FOR HYPERLIPIDEMIA: ICD-10-CM

## 2022-06-02 DIAGNOSIS — Z23 NEED FOR COVID-19 VACCINE: ICD-10-CM

## 2022-06-02 DIAGNOSIS — Z11.4 SCREENING FOR HIV (HUMAN IMMUNODEFICIENCY VIRUS): ICD-10-CM

## 2022-06-02 DIAGNOSIS — F32.A DEPRESSION, UNSPECIFIED DEPRESSION TYPE: ICD-10-CM

## 2022-06-02 LAB
HBA1C MFR BLD: 5.5 % (ref 0–5.6)
HCV AB SERPL QL IA: NONREACTIVE
HIV 1+2 AB+HIV1 P24 AG SERPL QL IA: NONREACTIVE

## 2022-06-02 PROCEDURE — 86803 HEPATITIS C AB TEST: CPT | Performed by: STUDENT IN AN ORGANIZED HEALTH CARE EDUCATION/TRAINING PROGRAM

## 2022-06-02 PROCEDURE — 0064A COVID-19,PF,MODERNA (18+ YRS BOOSTER .25ML): CPT | Performed by: STUDENT IN AN ORGANIZED HEALTH CARE EDUCATION/TRAINING PROGRAM

## 2022-06-02 PROCEDURE — 80053 COMPREHEN METABOLIC PANEL: CPT | Performed by: STUDENT IN AN ORGANIZED HEALTH CARE EDUCATION/TRAINING PROGRAM

## 2022-06-02 PROCEDURE — 36415 COLL VENOUS BLD VENIPUNCTURE: CPT | Performed by: STUDENT IN AN ORGANIZED HEALTH CARE EDUCATION/TRAINING PROGRAM

## 2022-06-02 PROCEDURE — 91306 COVID-19,PF,MODERNA (18+ YRS BOOSTER .25ML): CPT | Performed by: STUDENT IN AN ORGANIZED HEALTH CARE EDUCATION/TRAINING PROGRAM

## 2022-06-02 PROCEDURE — 99213 OFFICE O/P EST LOW 20 MIN: CPT | Mod: 25 | Performed by: STUDENT IN AN ORGANIZED HEALTH CARE EDUCATION/TRAINING PROGRAM

## 2022-06-02 PROCEDURE — 83036 HEMOGLOBIN GLYCOSYLATED A1C: CPT | Performed by: STUDENT IN AN ORGANIZED HEALTH CARE EDUCATION/TRAINING PROGRAM

## 2022-06-02 PROCEDURE — 87389 HIV-1 AG W/HIV-1&-2 AB AG IA: CPT | Performed by: STUDENT IN AN ORGANIZED HEALTH CARE EDUCATION/TRAINING PROGRAM

## 2022-06-02 PROCEDURE — 80061 LIPID PANEL: CPT | Performed by: STUDENT IN AN ORGANIZED HEALTH CARE EDUCATION/TRAINING PROGRAM

## 2022-06-02 PROCEDURE — 99396 PREV VISIT EST AGE 40-64: CPT | Mod: 25 | Performed by: STUDENT IN AN ORGANIZED HEALTH CARE EDUCATION/TRAINING PROGRAM

## 2022-06-02 RX ORDER — TAMSULOSIN HYDROCHLORIDE 0.4 MG/1
0.4 CAPSULE ORAL DAILY
Qty: 90 CAPSULE | Refills: 3 | Status: SHIPPED | OUTPATIENT
Start: 2022-06-02 | End: 2022-08-08

## 2022-06-02 RX ORDER — ESCITALOPRAM OXALATE 20 MG/1
20 TABLET ORAL DAILY
Qty: 30 TABLET | Refills: 11 | Status: SHIPPED | OUTPATIENT
Start: 2022-06-02

## 2022-06-02 RX ORDER — GABAPENTIN 300 MG/1
300 CAPSULE ORAL AT BEDTIME
Qty: 90 CAPSULE | Refills: 0 | Status: SHIPPED | OUTPATIENT
Start: 2022-06-02 | End: 2022-08-08

## 2022-06-02 RX ORDER — TERBINAFINE HYDROCHLORIDE 250 MG/1
250 TABLET ORAL DAILY
Qty: 90 TABLET | Refills: 0 | Status: SHIPPED | OUTPATIENT
Start: 2022-06-02 | End: 2022-08-31

## 2022-06-02 ASSESSMENT — ENCOUNTER SYMPTOMS
FREQUENCY: 1
NERVOUS/ANXIOUS: 1
EYE PAIN: 0
HEMATOCHEZIA: 0
SORE THROAT: 0
HEARTBURN: 0
HEMATURIA: 0
MYALGIAS: 1
PARESTHESIAS: 0
HEADACHES: 0
WEAKNESS: 0
DIZZINESS: 1
PALPITATIONS: 0
NAUSEA: 0
CONSTIPATION: 1
COUGH: 1
CHILLS: 0
ABDOMINAL PAIN: 0
JOINT SWELLING: 0
ARTHRALGIAS: 0
DIARRHEA: 0
SHORTNESS OF BREATH: 1
FEVER: 0
DYSURIA: 0

## 2022-06-02 ASSESSMENT — PAIN SCALES - GENERAL: PAINLEVEL: NO PAIN (0)

## 2022-06-02 ASSESSMENT — ACTIVITIES OF DAILY LIVING (ADL): CURRENT_FUNCTION: NO ASSISTANCE NEEDED

## 2022-06-02 NOTE — PATIENT INSTRUCTIONS
Patient Education   Personalized Prevention Plan  You are due for the preventive services outlined below.  Your care team is available to assist you in scheduling these services.  If you have already completed any of these items, please share that information with your care team to update in your medical record.  Health Maintenance Due   Topic Date Due     ANNUAL REVIEW OF HM ORDERS  Never done     Discuss Advance Care Planning  Never done     COPD Action Plan  Never done     HIV Screening  Never done     Annual Wellness Visit  Never done     Hepatitis C Screening  Never done     Diptheria Tetanus Pertussis (DTAP/TDAP/TD) Vaccine (1 - Tdap) Never done     Cholesterol Lab  Never done     Zoster (Shingles) Vaccine (1 of 2) Never done     COVID-19 Vaccine (2 - Moderna risk series) 12/17/2021

## 2022-06-03 LAB
ALBUMIN SERPL-MCNC: 3.9 G/DL (ref 3.4–5)
ALP SERPL-CCNC: 92 U/L (ref 40–150)
ALT SERPL W P-5'-P-CCNC: 39 U/L (ref 0–70)
ANION GAP SERPL CALCULATED.3IONS-SCNC: 2 MMOL/L (ref 3–14)
AST SERPL W P-5'-P-CCNC: 31 U/L (ref 0–45)
BILIRUB SERPL-MCNC: 0.5 MG/DL (ref 0.2–1.3)
BUN SERPL-MCNC: 14 MG/DL (ref 7–30)
CALCIUM SERPL-MCNC: 9.6 MG/DL (ref 8.5–10.1)
CHLORIDE BLD-SCNC: 104 MMOL/L (ref 94–109)
CHOLEST SERPL-MCNC: 168 MG/DL
CO2 SERPL-SCNC: 33 MMOL/L (ref 20–32)
CREAT SERPL-MCNC: 0.98 MG/DL (ref 0.66–1.25)
FASTING STATUS PATIENT QL REPORTED: YES
GFR SERPL CREATININE-BSD FRML MDRD: 88 ML/MIN/1.73M2
GLUCOSE BLD-MCNC: 113 MG/DL (ref 70–99)
HDLC SERPL-MCNC: 56 MG/DL
LDLC SERPL CALC-MCNC: 100 MG/DL
NONHDLC SERPL-MCNC: 112 MG/DL
POTASSIUM BLD-SCNC: 4.4 MMOL/L (ref 3.4–5.3)
PROT SERPL-MCNC: 7.8 G/DL (ref 6.8–8.8)
SODIUM SERPL-SCNC: 139 MMOL/L (ref 133–144)
TRIGL SERPL-MCNC: 61 MG/DL

## 2022-06-22 ENCOUNTER — TELEPHONE (OUTPATIENT)
Dept: PULMONOLOGY | Facility: CLINIC | Age: 62
End: 2022-06-22

## 2022-06-22 NOTE — TELEPHONE ENCOUNTER
LVM regarding need to reschedule appt with Dr. Jane on 7/26/22. Left direct call back phone number to reschedule.

## 2022-06-27 ENCOUNTER — TELEPHONE (OUTPATIENT)
Dept: PULMONOLOGY | Facility: CLINIC | Age: 62
End: 2022-06-27

## 2022-06-27 NOTE — TELEPHONE ENCOUNTER
LVM regarding need to reschedule appt with Dr. Jane on 7/26/22. Left direct call back phone number. I will send patient a [employer]oy[employer] message.

## 2022-06-29 ENCOUNTER — TELEPHONE (OUTPATIENT)
Dept: PULMONOLOGY | Facility: CLINIC | Age: 62
End: 2022-06-29

## 2022-06-29 NOTE — TELEPHONE ENCOUNTER
LVM regarding need to reschedule appt with Dr. Jane on 7/26/22 and other appts on 7/26/22 for our clinic. I stated in my message that this is our 3rd and final attempt to contact patient to reschedule and that we will be cancelling the appts on 7/26/22 for our clinic including the Dr. Jane appt. Left direct call back phone number and call center's phone number to reschedule. I stated in message that we will be sending a reschedule/cancel letter in the mail.

## 2022-07-01 ENCOUNTER — TELEPHONE (OUTPATIENT)
Dept: PULMONOLOGY | Facility: CLINIC | Age: 62
End: 2022-07-01

## 2022-07-01 NOTE — TELEPHONE ENCOUNTER
Patient called me and left me a voicemail message so I called him back and LVM regarding rescheduling his appt on 7/26/22 with Dr. Jane. Left direct call back phone number to reschedule.

## 2022-07-26 ENCOUNTER — PRE VISIT (OUTPATIENT)
Dept: PULMONOLOGY | Facility: CLINIC | Age: 62
End: 2022-07-26

## 2022-08-08 DIAGNOSIS — B35.1 ONYCHOMYCOSIS: ICD-10-CM

## 2022-08-12 RX ORDER — TERBINAFINE HYDROCHLORIDE 250 MG/1
TABLET ORAL
Qty: 56 TABLET | Refills: 0 | OUTPATIENT
Start: 2022-08-12

## 2022-08-12 NOTE — TELEPHONE ENCOUNTER
Please call patient.  Treatment is for 90 days.  He was given 90 days on 6/2/22.  SHould not need more.    Usually takes additional 3 months after treatment complete to clear.     Sg Serna MD  Internal Medicine and Pediatrics

## 2022-08-15 NOTE — TELEPHONE ENCOUNTER
Called and left message for patient requesting a call back. Chitra Gr RN on 8/15/2022 at 8:20 AM

## 2022-08-19 ENCOUNTER — TELEPHONE (OUTPATIENT)
Dept: PEDIATRICS | Facility: CLINIC | Age: 62
End: 2022-08-19

## 2022-08-19 NOTE — TELEPHONE ENCOUNTER
Reason for Call:  Other returning call    Detailed comments: patient called back and states someone called him.  [facility] IM/PEDS??  No notes in chart.     Please cootact patient.  Thank you.    Phone Number Patient can be reached at: Home number on file [phone] (home)    Best Time: any    Can we leave a detailed message on this number? YES    Call taken on 8/19/2022 at 1:52 PM by Karen Gonzalez

## 2022-09-08 DIAGNOSIS — R10.2 PELVIC PAIN IN MALE: ICD-10-CM

## 2022-09-08 DIAGNOSIS — G57.93 NEUROPATHIC PAIN OF BOTH LEGS: ICD-10-CM

## 2022-09-08 NOTE — TELEPHONE ENCOUNTER
Routing refill request to provider for review/approval because:  Drug not on the [facility] refill protocol     Chitra Gr RN on 9/8/2022 at 3:37 PM

## 2022-09-09 RX ORDER — GABAPENTIN 300 MG/1
300 CAPSULE ORAL 3 TIMES DAILY
Qty: 90 CAPSULE | Refills: 5 | Status: SHIPPED | OUTPATIENT
Start: 2022-09-09 | End: 2023-05-02

## 2022-09-09 RX ORDER — TERBINAFINE HYDROCHLORIDE 250 MG/1
TABLET ORAL
Qty: 30 TABLET | Refills: 11 | OUTPATIENT
Start: 2022-09-09

## 2022-09-10 ENCOUNTER — HEALTH MAINTENANCE LETTER (OUTPATIENT)
Age: 62
End: 2022-09-10

## 2023-05-01 DIAGNOSIS — G57.93 NEUROPATHIC PAIN OF BOTH LEGS: ICD-10-CM

## 2023-05-01 DIAGNOSIS — R10.2 PELVIC PAIN IN MALE: ICD-10-CM

## 2023-05-02 RX ORDER — GABAPENTIN 300 MG/1
CAPSULE ORAL
Qty: 90 CAPSULE | Refills: 11 | Status: SHIPPED | OUTPATIENT
Start: 2023-05-02 | End: 2023-07-11

## 2023-07-11 DIAGNOSIS — G57.93 NEUROPATHIC PAIN OF BOTH LEGS: ICD-10-CM

## 2023-07-11 DIAGNOSIS — R10.2 PELVIC PAIN IN MALE: ICD-10-CM

## 2023-07-11 RX ORDER — GABAPENTIN 300 MG/1
CAPSULE ORAL
Qty: 90 CAPSULE | Refills: 10 | Status: SHIPPED | OUTPATIENT
Start: 2023-07-11

## 2023-07-11 NOTE — TELEPHONE ENCOUNTER
Dr. Serna,    [facility] on behalf of pt to change pharmacy for [facility] [address] [facility].     Pended med and pharmacy, please review.    Thanks!  Kenneth Shanks RN on 7/11/2023 at 11:39 AM

## 2023-07-23 ENCOUNTER — HEALTH MAINTENANCE LETTER (OUTPATIENT)
Age: 63
End: 2023-07-23

## 2024-09-15 ENCOUNTER — HEALTH MAINTENANCE LETTER (OUTPATIENT)
Age: 64
End: 2024-09-15